# Patient Record
Sex: FEMALE | Race: WHITE | NOT HISPANIC OR LATINO | Employment: PART TIME | RURAL
[De-identification: names, ages, dates, MRNs, and addresses within clinical notes are randomized per-mention and may not be internally consistent; named-entity substitution may affect disease eponyms.]

---

## 2019-06-13 ENCOUNTER — CONVERSION ENCOUNTER (OUTPATIENT)
Dept: FAMILY MEDICINE CLINIC | Facility: CLINIC | Age: 24
End: 2019-06-13

## 2019-06-13 LAB
BILIRUB UR QL STRIP: NEGATIVE
CONV PROTEIN IN URINE BY AUTOMATED TEST STRIP: NEGATIVE
CONV UROBILINOGEN IN URINE BY AUTOMATED TEST STRIP: NEGATIVE MG/DL
GLUCOSE UR QL: NEGATIVE MG/DL
KETONES UR QL STRIP: NEGATIVE
NITRITE UR QL STRIP: NEGATIVE
PH UR STRIP.AUTO: 6.5 [PH]
SP GR UR STRIP: 1.02 (ref 1–1.03)

## 2019-06-21 ENCOUNTER — OFFICE VISIT (OUTPATIENT)
Dept: FAMILY MEDICINE CLINIC | Facility: CLINIC | Age: 24
End: 2019-06-21

## 2019-06-21 VITALS
OXYGEN SATURATION: 98 % | TEMPERATURE: 97.7 F | BODY MASS INDEX: 46.65 KG/M2 | DIASTOLIC BLOOD PRESSURE: 86 MMHG | RESPIRATION RATE: 18 BRPM | HEIGHT: 65 IN | WEIGHT: 280 LBS | SYSTOLIC BLOOD PRESSURE: 126 MMHG | HEART RATE: 98 BPM

## 2019-06-21 DIAGNOSIS — J01.91 ACUTE RECURRENT SINUSITIS, UNSPECIFIED LOCATION: Primary | ICD-10-CM

## 2019-06-21 DIAGNOSIS — J30.1 SEASONAL ALLERGIC RHINITIS DUE TO POLLEN: ICD-10-CM

## 2019-06-21 PROBLEM — J45.909 ASTHMA: Status: ACTIVE | Noted: 2019-06-21

## 2019-06-21 PROCEDURE — 99213 OFFICE O/P EST LOW 20 MIN: CPT | Performed by: FAMILY MEDICINE

## 2019-06-21 PROCEDURE — 96372 THER/PROPH/DIAG INJ SC/IM: CPT | Performed by: FAMILY MEDICINE

## 2019-06-21 RX ORDER — METRONIDAZOLE 500 MG/1
TABLET ORAL
Refills: 0 | COMMUNITY
Start: 2019-06-13 | End: 2019-06-21

## 2019-06-21 RX ORDER — AZELASTINE HCL 205.5 UG/1
2 SPRAY NASAL 2 TIMES DAILY PRN
Qty: 30 ML | Refills: 2 | Status: SHIPPED | OUTPATIENT
Start: 2019-06-21 | End: 2019-09-04

## 2019-06-21 RX ORDER — CEFTRIAXONE 1 G/1
1 INJECTION, POWDER, FOR SOLUTION INTRAMUSCULAR; INTRAVENOUS ONCE
Status: COMPLETED | OUTPATIENT
Start: 2019-06-21 | End: 2019-06-21

## 2019-06-21 RX ORDER — AZITHROMYCIN 250 MG/1
TABLET, FILM COATED ORAL
Qty: 6 TABLET | Refills: 0 | Status: SHIPPED | OUTPATIENT
Start: 2019-06-21 | End: 2019-09-04

## 2019-06-21 RX ORDER — FLUTICASONE PROPIONATE 50 MCG
2 SPRAY, SUSPENSION (ML) NASAL DAILY
Qty: 1 BOTTLE | Refills: 2 | Status: SHIPPED | OUTPATIENT
Start: 2019-06-21 | End: 2019-09-04

## 2019-06-21 RX ADMIN — CEFTRIAXONE 1 G: 1 INJECTION, POWDER, FOR SOLUTION INTRAMUSCULAR; INTRAVENOUS at 09:38

## 2019-06-21 NOTE — PATIENT INSTRUCTIONS

## 2019-06-21 NOTE — PROGRESS NOTES
"Subjective   Chica Burnett is a 24 y.o. female.     URI    This is a new problem. The current episode started in the past 7 days. The problem has been unchanged. The maximum temperature recorded prior to her arrival was 100.4 - 100.9 F. Associated symptoms include abdominal pain, congestion, coughing, diarrhea, headaches, nausea, a plugged ear sensation, rhinorrhea, sinus pain, sneezing, a sore throat, swollen glands, vomiting and wheezing. Pertinent negatives include no chest pain, ear pain or rash. She has tried acetaminophen, antihistamine and decongestant for the symptoms. The treatment provided no relief.     She reports severe allergies.  No OTC medications help.  She has been on immunotherapy in the past.  Currently only taking Benadryl.     The following portions of the patient's history were reviewed and updated as appropriate: allergies, current medications, past family history, past medical history, past social history, past surgical history and problem list.    Review of Systems   Constitutional: Positive for fever. Negative for chills.   HENT: Positive for congestion, rhinorrhea, sinus pressure, sneezing and sore throat. Negative for ear discharge, ear pain and facial swelling.    Eyes: Negative for double vision, discharge and visual disturbance.   Respiratory: Positive for cough and wheezing. Negative for shortness of breath.    Cardiovascular: Negative for chest pain, palpitations and leg swelling.   Gastrointestinal: Positive for abdominal pain, diarrhea, nausea and vomiting.   Musculoskeletal: Negative for myalgias.   Skin: Negative for rash.   Neurological: Positive for headache. Negative for syncope.       Objective    Vitals:    06/21/19 0853   BP: 126/86   Pulse: 98   Resp: 18   Temp: 97.7 °F (36.5 °C)   TempSrc: Oral   SpO2: 98%   Weight: 127 kg (280 lb)   Height: 165.1 cm (65\")     Physical Exam   Constitutional: She appears well-developed and well-nourished. No distress.   HENT:   Head: " Normocephalic and atraumatic.   Right Ear: Ear canal normal. Tympanic membrane is not perforated, not erythematous and not bulging.   Left Ear: Ear canal normal. Tympanic membrane is not perforated, not erythematous and not bulging.   Nose: No rhinorrhea.   Mouth/Throat: Uvula is midline and mucous membranes are normal. Posterior oropharyngeal erythema present. No oropharyngeal exudate or posterior oropharyngeal edema.   Eyes: Conjunctivae, EOM and lids are normal. Pupils are equal, round, and reactive to light. Right eye exhibits no discharge. Left eye exhibits no discharge.   Neck: Normal range of motion. Neck supple. No edema present.   Cardiovascular: Normal rate, regular rhythm and normal heart sounds.   Pulmonary/Chest: Effort normal and breath sounds normal.   Musculoskeletal: Normal range of motion.   Lymphadenopathy:     She has no cervical adenopathy.   Neurological: She is alert. No cranial nerve deficit.   Skin: No rash noted.   Psychiatric: She has a normal mood and affect. Thought content normal.         Assessment/Plan   Chica was seen today for uri.    Diagnoses and all orders for this visit:    Acute recurrent sinusitis, unspecified location  -     azithromycin (ZITHROMAX Z-SHAHID) 250 MG tablet; Take 2 tablets the first day, then 1 tablet daily for 4 days.  -     cefTRIAXone (ROCEPHIN) injection 1 g    Seasonal allergic rhinitis due to pollen  -     fluticasone (FLONASE) 50 MCG/ACT nasal spray; 2 sprays into the nostril(s) as directed by provider Daily.  -     azelastine (ASTEPRO) 0.15 % solution nasal spray; 2 sprays into the nostril(s) as directed by provider 2 (Two) Times a Day As Needed for Rhinitis.

## 2019-06-22 PROBLEM — F32.A ANXIETY AND DEPRESSION: Status: ACTIVE | Noted: 2019-06-22

## 2019-06-22 PROBLEM — F41.9 ANXIETY AND DEPRESSION: Status: ACTIVE | Noted: 2019-06-22

## 2019-06-22 PROBLEM — E66.01 MORBID OBESITY (HCC): Status: ACTIVE | Noted: 2019-06-22

## 2019-06-22 PROBLEM — B01.9 CHICKEN POX: Status: ACTIVE | Noted: 2019-06-22

## 2019-06-22 PROBLEM — F60.9 PERSONALITY DISORDER (HCC): Status: ACTIVE | Noted: 2019-06-22

## 2019-06-22 PROBLEM — O99.340 MENTAL DISORDER DURING PREGNANCY: Status: ACTIVE | Noted: 2019-06-22

## 2019-06-22 PROBLEM — K58.9 IBS (IRRITABLE BOWEL SYNDROME): Status: ACTIVE | Noted: 2019-06-22

## 2019-06-22 PROBLEM — K21.9 GERD (GASTROESOPHAGEAL REFLUX DISEASE): Status: ACTIVE | Noted: 2019-06-22

## 2019-06-22 PROBLEM — E72.10 DISORDER OF SULFUR-BEARING AMINO ACID METABOLISM (HCC): Status: ACTIVE | Noted: 2019-06-22

## 2019-06-22 PROBLEM — IMO0001 BIRTH CONTROL: Status: ACTIVE | Noted: 2019-06-22

## 2019-06-22 PROBLEM — B19.20 HEPATITIS-C: Status: ACTIVE | Noted: 2019-06-22

## 2019-06-22 PROBLEM — IMO0002 LARGE FOR GESTATIONAL AGE FETUS: Status: ACTIVE | Noted: 2019-06-22

## 2019-06-22 PROBLEM — E16.2 HYPOGLYCEMIA: Status: ACTIVE | Noted: 2019-06-22

## 2019-06-22 PROBLEM — F17.200 TOBACCO USE DISORDER: Status: ACTIVE | Noted: 2019-06-22

## 2019-06-22 PROBLEM — K80.20 CALCULUS OF GALLBLADDER: Status: ACTIVE | Noted: 2018-09-25

## 2019-06-22 RX ORDER — BUSPIRONE HYDROCHLORIDE 10 MG/1
TABLET ORAL
COMMUNITY
Start: 2019-02-21 | End: 2019-09-04

## 2019-06-22 RX ORDER — ZIPRASIDONE HYDROCHLORIDE 60 MG/1
1 CAPSULE ORAL DAILY
COMMUNITY
Start: 2019-01-23 | End: 2019-09-04

## 2019-06-24 ENCOUNTER — OFFICE VISIT (OUTPATIENT)
Dept: FAMILY MEDICINE CLINIC | Facility: CLINIC | Age: 24
End: 2019-06-24

## 2019-06-24 VITALS
HEART RATE: 80 BPM | BODY MASS INDEX: 46.95 KG/M2 | SYSTOLIC BLOOD PRESSURE: 147 MMHG | RESPIRATION RATE: 18 BRPM | DIASTOLIC BLOOD PRESSURE: 70 MMHG | WEIGHT: 281.8 LBS | TEMPERATURE: 98.1 F | OXYGEN SATURATION: 98 % | HEIGHT: 65 IN

## 2019-06-24 DIAGNOSIS — Z48.02 VISIT FOR SUTURE REMOVAL: Primary | ICD-10-CM

## 2019-06-24 PROCEDURE — 99024 POSTOP FOLLOW-UP VISIT: CPT | Performed by: FAMILY MEDICINE

## 2019-07-19 NOTE — PROGRESS NOTES
Office Visit     Chica Burnett  6/13/2019 2:10 PM  Location:  [facility]  Patient #:  [id number]    /  Language:  English  /  Race:  White  Female          History of Present Illness  The patient is a 24 year old female who presents with a complaint of Lesion. The lesion appeared gradually and has been occurring for years. The course has been increasing in size. The lesion is characterized as pustular and raised above the skin. The   lesion is located over the upper extremity (Left Arm). The symptoms have been associated with recent enlargement, significant change in physical appearance (reddening or pigmentation change), pain and itching, while the symptoms have not been associated   with obstructs orifice, clinically suspicious for malignancy, previous biopsy suggests malignancy, recurrent physical trauma due to location, risk of rupture, inflammation, infection, bleeding, intense itching, alopecia, fatigue, fever, loss of sensation,   lymphadenopathy, mucous membrane lesions, nail changes or weight loss.    Additional Reason for Visit:    Back pain   is described as the following:  The onset of the back pain has been gradual and has been occurring in a persistent pattern for 1 month (She states her issues started when she got a job and started working). The course has been increasing and occurs more in early morning. The back pain is   characterized as a dull ache, stabbing, piercing, shooting and burning. The back pain is described as being located in the lower back and lumbar area. The pain is precipitated by heavy weight lifting. The symptoms are aggravated by exertion, prolonged   standing and prolonged sitting. The symptoms have no relieving factors. The symptoms have been associated with back stiffness and leg weakness, while the symptoms have not been associated with abdominal pain, arthritis of peripheral joints, chills, bladder   dysfunction, dysmenorrhea, dysuria, fever, flank  pain, hip pain, history  of myelography, history of back surgery, history of disc prolapse, history of malignancy, incontinence of stool, incontinence of urine, menorrhagia, paresthesias in leg, trauma,   urethral discharge, use of anti-coagulants, use of corticosteroids or vaginal discharge.    UTI  is described as the following:  The symptoms have been occurring for 6 days and have been  increasing. The urine is described as yellow and thick. The symptoms have been associated with vulvar irritation, low back pain and vaginal discharge (She states that she has a ottoniel odor coming   from down there and she states she has alot of itching as if she has a yeast infecton minus the discharge), while the symptoms have not been associated with arthralgia, fever, pain with defecation, perineal pain, skin rash, vulvar lesion, abdominal pain,   chills, perineal pruritus, abdominal cramps, diarrhea, nausea, vaginal bleeding, headache, pain with intercourse, vomiting or muscle pain. There is a history of sexual contact with a person exposed to an STD (Not to her knowledge) and new sexual partner,   while there is no history of sexual contact with a person having an STD, use of tampons, possible vaginal foreign body, douching, use of contraceptive devices, sexual assault, trauma, vulvovaginal exposure to chemical irritants, recent catheterization or   current catheterization. There is a medical history of recurrent urinary tract infections. The patient denies the use of oral contraceptives, antibiotics or hormone replacement therapy.           Problem List/Past Medical:    IBS (irritable bowel syndrome) (K58.9)    Chicken pox (B01.9)    GERD (gastroesophageal reflux disease) (K21.9)    Personality disorder (F60.9)    Asthma (J45.909)    Anxiety and depression (F41.9, F32.9)      Allergies:    amoxicillin    penicillins      Family History:    COPD (chronic obstructive pulmonary disease) (Z82.5)  Maternal Aunt.  Coronary Artery  Disease and/or Hypertension (Z82.49)  Mother, Maternal Grandfather, Maternal Grandmother, Maternal Aunt, Maternal Great Aunt.  CVA (Z82.3)  Maternal Grandmother.  Diabetes Mellitus (Z83.3)  Mother, Maternal Grandfather, Maternal Aunt, Sister.  Hyperlipidemia (Z83.438)  Maternal Aunt, Maternal Grandfather.  Skin Condidtions (Z84.0)  Son. eczema  Skin cancer (Z80.8)  Maternal Grandfather.  great grandfather: Brain cancer    liver cancer  Father, Paternal Grandmother, Paternal Grandfather.    Social History:    Alcohol Use  Occasional alcohol use.  Caffeine Use  a pot of coffee, moster enerygy drinks( 10 a week), soda 1-2 A week  Drug Use  Uses marijuana. occasionally less than 1 time a month  Exercise History  Exercises occasionally.  Living Situation  Lives alone.  Occupation  unemployed  Seat Belt Use  Always uses seat belts.  Tobacco Use  Former smoker. quit in June of 2018    Medication History:    BusPIRone HCl  (10MG Tablet, 1 (one) Oral two times daily, Taken starting 02/21/2019) Active.  Geodon  (60MG Capsule, 1 (one) Capsule Oral daily, Taken starting 01/23/2019) Active.  Medications Reconciled             Review of Symptons  General    Not Present-  Chills     -  Fever     -    and  Night Sweats     -    Skin    Present-  New Lesions     +    Not Present-  Rash     -    HEENT    Not Present-  Headache     -    Neck    Not Present-  Neck Stiffness     -    Respiratory    Not Present-  Dyspnea     -    Cardiovascular    Not Present-  Chest Pain     -    and  Palpitations     -    Gastrointestinal    Not Present-  Abdominal Pain     -  Constipation     -  Diarrhea     -    and  Vomiting     -    Female Genitourinary    Present-  Vaginal itching/burning     +    Musculoskeletal    Present-  Back Pain     +    Neurological    Not Present-  Dizziness     -    and  Visual Changes     -    Endocrine    Not Present-  Polydipsia     -    and  Polyuria     -           Physical Exam    General  Mental Status - Alert      Orientation - Oriented X4       Head and Neck  Neck - Global Assessment -     supple     Thyroid - Gland Characteristics -     no thyromegaly       ENMT  Ears - External Auditory Canal -  Left -     clear     Right -     clear          Otoscopic Exam - Middle Ear -  Left -     no effusion noted     Right -     no effusion noted     Tympanic Membrane -  Left -     No erythema noted     Right -     No   erythema noted     Nose and Sinuses - Inspection of the nares -  Bilateral -     without discharge     Frontal Sinuses -  Bilateral -     no tenderness observed     Maxillary Sinuses -  Bilateral -     no tenderness observed     Mouth and Throat -        Oral Cavity/Oropharynx - Oral Mucosa -     mucous membranes moist     Oropharynx -     No erythema       Chest and Lung Exam  Inspection - Movements -     Symmetrical     Accessory muscles -     No use of accessory muscles in breathing     Auscultation -      Breath sounds - Air flow -     Clear to auscultation          Adventitious sounds -   -     no rhonchi     no rales     no   wheezes       Cardiovascular  Inspection - Jugular vein -  Bilateral -     No Distention     Auscultation - Rhythm -     Regular     Heart Sounds -     S1 WNL     S2 WNL     No S3     No S4          Murmurs & Other Heart Sounds - Auscultation of the heart reveals -     No Murmurs       Abdomen  Palpation/Percussion - Palpation and Percussion of the abdomen reveal -     soft     Non Tender     No Rebound tenderness     No Rigidity (guarding)     No Jar tenderness     Liver -     Normal     Spleen -     Normal     Auscultation - Auscultation of the   abdomen reveals -     Bowel sounds normal       Female Genitourinary  External Genitalia -      Vulva - Hair Distribution -     Normal     Lesions -     None     Perineum -     Normal          Urethra - Characteristics -     Non Tender     Discharge -     None     Speculum & Bimanual -      Vagina - Vaginal Lesions -       None          Vaginal  Wall - Enterocele -     No enterocele     Rectocele -     No rectocele          Cervix - Characteristics -     No Motion tenderness     No Lesions     Discharge -     Copious     Malodorous          Uterus - Characteristics -     Non   Tender     Not Prolapsed     Not Smooth     no Slightly enlarged     Position -     Midposition     Fundal Height -     Just above symphysis pubis     Vaginal Mucosa -     Normal          Adnexa - Characteristics -  Left -     Non Palpable     Non Tender       Right -     Non Palpable     Non Tender     Masses -     No Adnexal Masses     Bladder -     Normal       Peripheral Vascular  Lower Extremity -      Palpation - Tenderness -  Bilateral -     Non Tender     Dorsalis pedis pulse -  Bilateral -     2+     Edema -  Bilateral -     No edema       Neurologic  Coordination - Normal     Gait - Normal                     Assessment and Plan:  UTI (urinary tract infection) (N39.0)    Prescriptions as below.  Common drug reactions discussed - watch for itching, rash, diarrhea and upset stomach.  Stop pyridium after 2 days to see if symptoms have improved.  Tylenol and/or ibuprofen for pain/fever.  Increase fluids.  Culture sent - will   follow-up with patient by phone once received.  Call or return to the office if no improvement in 3-4 days or if symptoms progress despite medication.        U/A Dip (23663)  Urine C & S (04147)    Low back pain (M54.5)    He was advised to use NSAIDs to treat his symptoms.      Benign skin lesion (L98.9)    The skin lesion was removed.        Tissue, Pathology Report  Biopsy, Punch,including simple closure, when performed (95396)    Vaginal itching (N89.8)    She has BV.  She was treat with Flagyl.        Started MetroNIDAZOLE 500MG, 1 (one) Tablet two times daily, #14, 7 days starting 06/13/2019, No Refill.  Local Order: Do not use alcohol for at least 24 hours after the last dose.  History Of Tobacco Use  (Z87.891)            Pt Education - Smoking:  Reasons Not To Smoke: discussed with patient and provided information.  Education about Importance of not smoking ()    Morbid obesity >40 (E66.01) <HCC22>            DOCUMENTATION OF FOLLOW-UP PLAN FOR PATIENT WITH BMI ABOVE NORMAL ()  Pt Education - Weight Loss Diets *: losing weight: discussed with patient and provided information.  Pt Education - How to access health information online: discussed with patient and provided information.                            Signed By:      6/30/2019 12:28 AM     Chuck Burnett MD  Signed electronically by Chuck Burnett MD (6/30/2019 12:28 AM)  __________     Disclaimer: Converted Note may not contain all data elements that existed in the [employer] source system. Please see [employer] for the original note message details.

## 2019-07-22 VITALS
OXYGEN SATURATION: 94 % | HEART RATE: 84 BPM | WEIGHT: 282.6 LBS | RESPIRATION RATE: 18 BRPM | HEIGHT: 62 IN | DIASTOLIC BLOOD PRESSURE: 83 MMHG | BODY MASS INDEX: 52.01 KG/M2 | SYSTOLIC BLOOD PRESSURE: 123 MMHG

## 2019-09-04 ENCOUNTER — OFFICE VISIT (OUTPATIENT)
Dept: FAMILY MEDICINE CLINIC | Facility: CLINIC | Age: 24
End: 2019-09-04

## 2019-09-04 VITALS
RESPIRATION RATE: 18 BRPM | BODY MASS INDEX: 47.85 KG/M2 | TEMPERATURE: 97.8 F | HEART RATE: 61 BPM | HEIGHT: 65 IN | SYSTOLIC BLOOD PRESSURE: 126 MMHG | OXYGEN SATURATION: 98 % | DIASTOLIC BLOOD PRESSURE: 74 MMHG | WEIGHT: 287.2 LBS

## 2019-09-04 DIAGNOSIS — R31.9 URINARY TRACT INFECTION WITH HEMATURIA, SITE UNSPECIFIED: Primary | ICD-10-CM

## 2019-09-04 DIAGNOSIS — N39.0 URINARY TRACT INFECTION WITH HEMATURIA, SITE UNSPECIFIED: Primary | ICD-10-CM

## 2019-09-04 DIAGNOSIS — A59.9 TRICHIMONIASIS: ICD-10-CM

## 2019-09-04 LAB
BILIRUB BLD-MCNC: NEGATIVE MG/DL
CLARITY, POC: ABNORMAL
COLOR UR: YELLOW
GLUCOSE UR STRIP-MCNC: NEGATIVE MG/DL
KETONES UR QL: ABNORMAL
LEUKOCYTE EST, POC: ABNORMAL
NITRITE UR-MCNC: NEGATIVE MG/ML
PH UR: 5 [PH] (ref 5–8)
PROT UR STRIP-MCNC: ABNORMAL MG/DL
RBC # UR STRIP: ABNORMAL /UL
SP GR UR: 1.02 (ref 1–1.03)
UROBILINOGEN UR QL: NORMAL

## 2019-09-04 PROCEDURE — 81002 URINALYSIS NONAUTO W/O SCOPE: CPT | Performed by: FAMILY MEDICINE

## 2019-09-04 PROCEDURE — 99213 OFFICE O/P EST LOW 20 MIN: CPT | Performed by: FAMILY MEDICINE

## 2019-09-04 RX ORDER — METRONIDAZOLE 500 MG/1
500 TABLET ORAL 2 TIMES DAILY
Qty: 14 TABLET | Refills: 1 | Status: SHIPPED | OUTPATIENT
Start: 2019-09-04 | End: 2019-09-11

## 2019-09-04 NOTE — PROGRESS NOTES
Chief Complaint   Patient presents with   • Vaginal Discharge   • Urinary Tract Infection       History of Present Illness:  Subjective   Chica Burnett is a 24 y.o. female.   Vaginal Discharge   The patient's primary symptoms include genital itching, a genital odor, a genital rash, pelvic pain and vaginal discharge. The patient's pertinent negatives include no vaginal bleeding. This is a recurrent problem. The current episode started more than 1 month ago. The problem occurs daily. The problem has been gradually worsening. The pain is mild. The problem affects both sides. She is not pregnant. Associated symptoms include abdominal pain, back pain, discolored urine, frequency, headaches, nausea, painful intercourse, rash and urgency. The vaginal discharge was green, milky, copious, mucoid, brown, thick and yellow. There has been no bleeding. She has not been passing clots. She has not been passing tissue. The symptoms are aggravated by intercourse, urinating and tactile pressure. She has tried warm baths, douching and antibiotics for the symptoms. The treatment provided no relief. She is sexually active. It is unknown whether or not her partner has an STD. She uses nothing for contraception. Her menstrual history has been regular. Her past medical history is significant for vaginosis.   Urinary Frequency    This is a recurrent problem. The current episode started more than 1 month ago. The problem has been gradually worsening. The quality of the pain is described as burning. The pain is at a severity of 7/10. The pain is moderate. There has been no fever. She is sexually active. There is no history of pyelonephritis. Associated symptoms include frequency, nausea and urgency. She has tried nothing for the symptoms. Her past medical history is significant for kidney stones and recurrent UTIs.        Allergies:  Allergies   Allergen Reactions   • Penicillin G Unknown (See Comments)       Social History:  Social  History     Socioeconomic History   • Marital status:      Spouse name: Not on file   • Number of children: Not on file   • Years of education: Not on file   • Highest education level: Not on file   Tobacco Use   • Smoking status: Former Smoker     Last attempt to quit: 2018     Years since quittin.3   • Smokeless tobacco: Never Used   Substance and Sexual Activity   • Alcohol use: Yes     Comment: Occasional   • Drug use: No     Types: Marijuana     Comment: occasionally less than 1 time a month   • Sexual activity: Defer   Social History Narrative    ** Merged History Encounter **            Family History:  Family History   Problem Relation Age of Onset   • Coronary artery disease Mother    • Diabetes Mother         Diabetes Mellitus   • Liver cancer Father    • Diabetes Sister         Diabetes Mellitus   • Eczema Son    • COPD Maternal Aunt    • Coronary artery disease Maternal Aunt    • Diabetes Maternal Aunt         Diabetes Mellitus   • Hyperlipidemia Maternal Aunt         Hyperlipidemia   • Coronary artery disease Maternal Grandmother    • Stroke Maternal Grandmother    • Coronary artery disease Maternal Grandfather    • Diabetes Maternal Grandfather         Diabetes Mellitus   • Hyperlipidemia Maternal Grandfather         Hyperlipidemia   • Skin cancer Maternal Grandfather    • Liver cancer Paternal Grandmother    • Liver cancer Paternal Grandfather    • Brain cancer Other         great grandfather   • Hypertension Mother    • Cancer Father         liver   • COPD Maternal Aunt    • Hypertension Maternal Aunt    • Coronary artery disease Maternal Aunt    • Diabetes Maternal Aunt    • Hyperlipidemia Maternal Aunt    • Hypertension Maternal Grandmother    • Hypertension Maternal Grandfather    • Cancer Maternal Grandfather         skin   • Cancer Paternal Grandmother         liver   • Cancer Paternal Grandfather         liver   • Hypertension Other         Maternal Great Aunt        Past Medical  History :  Active Ambulatory Problems     Diagnosis Date Noted   • Asthma 06/21/2019   • Anxiety and depression 06/22/2019   • Birth control 06/22/2019   • Calculus of gallbladder 09/25/2018   • Chicken pox 06/22/2019   • 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   • GERD (gastroesophageal reflux disease) 06/22/2019   • Hepatitis-C 06/22/2019   • Tobacco use disorder 06/22/2019   • Hypoglycemia 06/22/2019   • IBS (irritable bowel syndrome) 06/22/2019   • Morbid obesity (CMS/HCC) 06/22/2019   • Personality disorder (CMS/[facility]) 06/22/2019   • Mental disorder during pregnancy 06/22/2019   • Large for gestational age fetus 06/22/2019     Resolved Ambulatory Problems     Diagnosis Date Noted   • No Resolved Ambulatory Problems     Past Medical History:   Diagnosis Date   • Abdominal pain    • Abdominal pain    • Anxiety and depression    • Anxiety and depression    • Asthma    • Benign skin lesion    • Birth control    • BV (bacterial vaginosis)    • Cellulitis    • Chicken pox    • Ear pain, right    • Flu-like symptoms    • Gallstones    • GERD (gastroesophageal reflux disease)    • Hepatitis-C    • Heterozygous MTHFR mutation C677T (CMS/[facility])    • History of inadequate prenatal care    • History of tobacco use    • Hypoglycemia    • IBS (irritable bowel syndrome)    • Lice    • Low back pain    • Mental disorder in pregnancy    • Morbid obesity with BMI of 40.0-44.9, adult (CMS/[facility])    • Otitis media, acute    • Personality disorder (CMS/[facility])    • RUQ abdominal pain    • Sore throat    • Tobacco use disorder    • UTI (urinary tract infection)    • Vaginal itching    • Weight loss        Medication List:  No current outpatient medications on file.    Past Surgical History:  Past Surgical History:   Procedure Laterality Date   • CHOLECYSTECTOMY     • TONSILLECTOMY AND ADENOIDECTOMY      Septmeber 16, 2010 at [facility]   • TONSILLECTOMY AND ADENOIDECTOMY  09/16/2010        The following  "portions of the patient's history were reviewed and updated as appropriate: allergies, current medications, past family history, past medical history, past social history, past surgical history and problem list.    Review Of Systems:  Review of Systems   Gastrointestinal: Positive for abdominal pain and nausea.   Genitourinary: Positive for frequency, pelvic pain, urgency and vaginal discharge.   Musculoskeletal: Positive for back pain.   Skin: Positive for rash.       Physical Exam:  Vital Signs:  Vitals:    09/04/19 1112   BP: 126/74   BP Location: Right arm   Patient Position: Sitting   Cuff Size: Large Adult   Pulse: 61   Resp: 18   Temp: 97.8 °F (36.6 °C)   TempSrc: Oral   SpO2: 98%   Weight: 130 kg (287 lb 3.2 oz)   Height: 165.1 cm (65\")     Body mass index is 47.79 kg/m².    Physical Exam   Constitutional: She is oriented to person, place, and time. She appears well-developed and well-nourished. She is active.   HENT:   Head: Normocephalic and atraumatic.   Nose: Nose normal.   Eyes: Conjunctivae and lids are normal. Pupils are equal, round, and reactive to light.   Neck: Normal range of motion. Neck supple.   Cardiovascular: Normal rate, regular rhythm, normal heart sounds and normal pulses.   Pulmonary/Chest: Effort normal and breath sounds normal. No respiratory distress.   Abdominal: Soft. Bowel sounds are normal.   Genitourinary: Pelvic exam was performed with patient supine. Cervix exhibits discharge and friability. Right adnexum displays tenderness. There is erythema and tenderness in the vagina. Vaginal discharge found.   Musculoskeletal: Normal range of motion.   Neurological: She is alert and oriented to person, place, and time.   Skin: Skin is warm and dry. Capillary refill takes less than 2 seconds.   Psychiatric: She has a normal mood and affect. Her behavior is normal. Judgment and thought content normal.   Vitals reviewed.        Assessment and Plan:  Diagnoses and all orders for this " visit:    1. Urinary tract infection with hematuria, site unspecified (Primary)  -     POCT urinalysis dipstick, manual  -     Urine Culture - Urine, Urine, Random Void    2. Trichimoniasis  -     metroNIDAZOLE (FLAGYL) 500 MG tablet; Take 1 tablet by mouth 2 (Two) Times a Day for 7 days. Do not use alcohol for at least 24 hours after the last dose.  Dispense: 14 tablet; Refill: 1

## 2019-09-09 LAB
BACTERIA UR CULT: NORMAL
BACTERIA UR CULT: NORMAL

## 2019-10-14 ENCOUNTER — TELEPHONE (OUTPATIENT)
Dept: FAMILY MEDICINE CLINIC | Facility: CLINIC | Age: 24
End: 2019-10-14

## 2019-10-14 NOTE — TELEPHONE ENCOUNTER
Attempted to call patient to notify her that she has to be seen- we can't send anything in for her.   Her voicemail hasn't been set up. Unable to leave message

## 2019-10-14 NOTE — TELEPHONE ENCOUNTER
Patient said needs antibiotic for vaginosis send to  [facility] in [place]--call  To advise

## 2019-11-20 ENCOUNTER — CLINICAL SUPPORT (OUTPATIENT)
Dept: FAMILY MEDICINE CLINIC | Facility: CLINIC | Age: 24
End: 2019-11-20

## 2019-11-20 DIAGNOSIS — Z23 NEED FOR HEPATITIS A VACCINATION: ICD-10-CM

## 2019-11-20 DIAGNOSIS — Z23 FLU VACCINE NEED: Primary | ICD-10-CM

## 2019-11-20 PROCEDURE — 90632 HEPA VACCINE ADULT IM: CPT | Performed by: FAMILY MEDICINE

## 2019-11-20 PROCEDURE — 90472 IMMUNIZATION ADMIN EACH ADD: CPT | Performed by: FAMILY MEDICINE

## 2019-11-20 PROCEDURE — 90471 IMMUNIZATION ADMIN: CPT | Performed by: FAMILY MEDICINE

## 2019-11-20 PROCEDURE — 90674 CCIIV4 VAC NO PRSV 0.5 ML IM: CPT | Performed by: FAMILY MEDICINE

## 2020-03-16 ENCOUNTER — CLINICAL SUPPORT (OUTPATIENT)
Dept: FAMILY MEDICINE CLINIC | Facility: CLINIC | Age: 25
End: 2020-03-16

## 2020-03-16 DIAGNOSIS — Z11.1 VISIT FOR TB SKIN TEST: Primary | ICD-10-CM

## 2020-03-16 PROCEDURE — 86580 TB INTRADERMAL TEST: CPT | Performed by: FAMILY MEDICINE

## 2020-03-19 ENCOUNTER — OFFICE VISIT (OUTPATIENT)
Dept: FAMILY MEDICINE CLINIC | Facility: CLINIC | Age: 25
End: 2020-03-19

## 2020-03-19 VITALS
HEART RATE: 78 BPM | WEIGHT: 286.8 LBS | TEMPERATURE: 98.6 F | HEIGHT: 65 IN | SYSTOLIC BLOOD PRESSURE: 112 MMHG | DIASTOLIC BLOOD PRESSURE: 61 MMHG | BODY MASS INDEX: 47.78 KG/M2 | OXYGEN SATURATION: 97 %

## 2020-03-19 DIAGNOSIS — H10.31 ACUTE BACTERIAL CONJUNCTIVITIS OF RIGHT EYE: ICD-10-CM

## 2020-03-19 DIAGNOSIS — H60.331 ACUTE SWIMMER'S EAR OF RIGHT SIDE: Primary | ICD-10-CM

## 2020-03-19 LAB
INDURATION: 0 MM (ref 0–10)
Lab: NORMAL
Lab: NORMAL
TB SKIN TEST: NEGATIVE

## 2020-03-19 PROCEDURE — 96372 THER/PROPH/DIAG INJ SC/IM: CPT | Performed by: FAMILY MEDICINE

## 2020-03-19 PROCEDURE — 99213 OFFICE O/P EST LOW 20 MIN: CPT | Performed by: FAMILY MEDICINE

## 2020-03-19 RX ORDER — DEXAMETHASONE SODIUM PHOSPHATE 1 MG/ML
1 SOLUTION/ DROPS OPHTHALMIC 4 TIMES DAILY
Qty: 5 ML | Refills: 1 | Status: SHIPPED | OUTPATIENT
Start: 2020-03-19 | End: 2020-03-26

## 2020-03-19 RX ORDER — DEXAMETHASONE SODIUM PHOSPHATE 10 MG/ML
10 INJECTION INTRAMUSCULAR; INTRAVENOUS ONCE
Status: COMPLETED | OUTPATIENT
Start: 2020-03-19 | End: 2020-03-19

## 2020-03-19 RX ORDER — TRAMADOL HYDROCHLORIDE 50 MG/1
50 TABLET ORAL EVERY 6 HOURS PRN
COMMUNITY
End: 2020-04-10

## 2020-03-19 RX ORDER — OFLOXACIN 3 MG/ML
1 SOLUTION/ DROPS OPHTHALMIC 4 TIMES DAILY
Qty: 1 EACH | Refills: 1 | Status: SHIPPED | OUTPATIENT
Start: 2020-03-19 | End: 2020-03-26

## 2020-03-19 RX ORDER — METHYLPREDNISOLONE ACETATE 80 MG/ML
80 INJECTION, SUSPENSION INTRA-ARTICULAR; INTRALESIONAL; INTRAMUSCULAR; SOFT TISSUE ONCE
Status: COMPLETED | OUTPATIENT
Start: 2020-03-19 | End: 2020-03-19

## 2020-03-19 RX ORDER — ACETAMINOPHEN 325 MG/1
650 TABLET ORAL EVERY 6 HOURS PRN
COMMUNITY
End: 2020-04-10

## 2020-03-19 RX ORDER — CIPROFLOXACIN 500 MG/1
500 TABLET, FILM COATED ORAL 2 TIMES DAILY
Qty: 20 TABLET | Refills: 0 | Status: SHIPPED | OUTPATIENT
Start: 2020-03-19 | End: 2020-04-10

## 2020-03-19 RX ADMIN — METHYLPREDNISOLONE ACETATE 80 MG: 80 INJECTION, SUSPENSION INTRA-ARTICULAR; INTRALESIONAL; INTRAMUSCULAR; SOFT TISSUE at 16:42

## 2020-03-19 RX ADMIN — DEXAMETHASONE SODIUM PHOSPHATE 10 MG: 10 INJECTION INTRAMUSCULAR; INTRAVENOUS at 16:41

## 2020-03-19 NOTE — ASSESSMENT & PLAN NOTE
She was given a Rocephin 1 g IM injection and a Depo-Medrol Medrol/dexamethasone IM injection.  She was prescribed ofloxacin and dexamethasone ophthalmic drops to put in her ears.  She was encouraged return to the clinic if her symptoms do not improve.

## 2020-03-19 NOTE — PROGRESS NOTES
Subjective   Chica Burnett is a 25 y.o. female.     Chief Complaint   Patient presents with   • Conjunctivitis   • Earache       Conjunctivitis    The current episode started yesterday. The onset was sudden. The problem occurs continuously. The problem has been gradually worsening. The problem is mild. Nothing relieves the symptoms. Nothing aggravates the symptoms. Associated symptoms include a fever, eye itching, ear discharge, ear pain, hearing loss, eye discharge and eye redness. Pertinent negatives include no nausea, no vomiting, no muscle aches, no cough and no wheezing. The eye pain is mild. The right eye is affected. The eye pain is not associated with movement. The eyelid exhibits redness. The fever has been present for 1 to 2 days. The maximum temperature noted was less than 100.4 F. The temperature was taken using an oral thermometer. Cough associated with: patient has no cough. Congestion type: no congestion. Sore throat severity: no sore throat. The ear pain is moderate. There is pain in the right ear. There is no abnormality behind the ear.   Earache    There is pain in the right ear. This is a new (ear pain started 2 weeks ago with a right itchy ear that truned into swollen and inflamed right ear patient stats it hurts to chew and open her mouth. ) problem. The current episode started 1 to 4 weeks ago. The problem occurs constantly. The problem has been gradually worsening. Maximum temperature: less the 100.4. The fever has been present for less than 1 day. The pain is at a severity of 10/10. The pain is severe. Associated symptoms include ear discharge and hearing loss. Pertinent negatives include no coughing or vomiting. She has tried nothing for the symptoms. The treatment provided no relief.   Fever    This is a new (patient also had a fever yesterday has been taking tylenol to help lower fever only lasted for one day ) problem. The current episode started yesterday. The problem occurs 2 to 4  times per day. The problem has been resolved. The maximum temperature noted was 99 to 99.9 F. The temperature was taken using an oral thermometer. Associated symptoms include ear pain. Pertinent negatives include no coughing, muscle aches, nausea, vomiting or wheezing. She has tried acetaminophen for the symptoms. The treatment provided significant relief.        The following portions of the patient's history were reviewed and updated as appropriate: allergies, current medications, past family history, past medical history, past social history, past surgical history and problem list.    Allergies:  Allergies   Allergen Reactions   • Penicillin G Unknown (See Comments)       Social History:  Social History     Socioeconomic History   • Marital status:      Spouse name: Not on file   • Number of children: Not on file   • Years of education: Not on file   • Highest education level: Not on file   Tobacco Use   • Smoking status: Former Smoker     Last attempt to quit: 2018     Years since quittin.8   • Smokeless tobacco: Never Used   Substance and Sexual Activity   • Alcohol use: Yes     Comment: Occasional   • Drug use: No     Types: Marijuana     Comment: occasionally less than 1 time a month   • Sexual activity: Defer   Social History Narrative    ** Merged History Encounter **            Family History:  Family History   Problem Relation Age of Onset   • Coronary artery disease Mother    • Diabetes Mother         Diabetes Mellitus   • Liver cancer Father    • Diabetes Sister         Diabetes Mellitus   • Eczema Son    • COPD Maternal Aunt    • Coronary artery disease Maternal Aunt    • Diabetes Maternal Aunt         Diabetes Mellitus   • Hyperlipidemia Maternal Aunt         Hyperlipidemia   • Coronary artery disease Maternal Grandmother    • Stroke Maternal Grandmother    • Coronary artery disease Maternal Grandfather    • Diabetes Maternal Grandfather         Diabetes Mellitus   • Hyperlipidemia  Maternal Grandfather         Hyperlipidemia   • Skin cancer Maternal Grandfather    • Liver cancer Paternal Grandmother    • Liver cancer Paternal Grandfather    • Brain cancer Other         great grandfather   • Hypertension Mother    • Cancer Father         liver   • COPD Maternal Aunt    • Hypertension Maternal Aunt    • Coronary artery disease Maternal Aunt    • Diabetes Maternal Aunt    • Hyperlipidemia Maternal Aunt    • Hypertension Maternal Grandmother    • Hypertension Maternal Grandfather    • Cancer Maternal Grandfather         skin   • Cancer Paternal Grandmother         liver   • Cancer Paternal Grandfather         liver   • Hypertension Other         Maternal Great Aunt        Past Medical History :  Patient Active Problem List   Diagnosis   • Asthma   • Anxiety and depression   • Birth control   • Calculus of gallbladder   • Chicken pox   • 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     • traMADol (ULTRAM) 50 MG tablet Take 50 mg by mouth Every 6 (Six) Hours As Needed for Moderate Pain .       No facility-administered encounter medications on file as of 3/19/2020.        Past Surgical History:  Past Surgical History:   Procedure Laterality Date   • CHOLECYSTECTOMY     • TONSILLECTOMY AND ADENOIDECTOMY      Septmeber 16, 2010 at [facility]   • TONSILLECTOMY AND ADENOIDECTOMY  09/16/2010       Review of Systems:  Review of Systems   Constitutional: Positive for fever.   HENT: Positive for ear  "discharge, ear pain and hearing loss.    Eyes: Positive for discharge, redness and itching.   Respiratory: Negative for cough and wheezing.    Gastrointestinal: Negative for nausea and vomiting.       I have reviewed and confirmed the accuracy of the ROS as documented by the MA/LPN/RN Chica Turner MA    Vital Signs:  Visit Vitals  /61   Pulse 78   Temp 98.6 °F (37 °C) (Oral)   Ht 165.1 cm (65\")   Wt 130 kg (286 lb 12.8 oz)   SpO2 97%   BMI 47.73 kg/m²       Physical Exam   Constitutional: She is oriented to person, place, and time. She appears well-developed and well-nourished.   HENT:   Head: Normocephalic.   Right Ear: There is drainage, swelling and tenderness. Tympanic membrane is injected.   Left Ear: External ear normal.   Nose: Nose normal.   Eyes: Right eye exhibits discharge and exudate. Right conjunctiva is injected.   Neck: Normal range of motion. Neck supple.   Cardiovascular: Normal rate and regular rhythm.   Pulmonary/Chest: Effort normal and breath sounds normal.   Musculoskeletal: Normal range of motion.   Neurological: She is alert and oriented to person, place, and time.   Skin: Skin is warm and dry. Capillary refill takes less than 2 seconds.   Vitals reviewed.      Assessment and Plan:  Diagnoses and all orders for this visit:    1. Acute swimmer's ear of right side (Primary)  Assessment & Plan:  She was given a Rocephin 1 g IM injection and a Depo-Medrol Medrol/dexamethasone IM injection.  She was prescribed ofloxacin and dexamethasone ophthalmic drops to put in her ears.  She was encouraged return to the clinic if her symptoms do not improve.    Orders:  -     ofloxacin (OCUFLOX) 0.3 % ophthalmic solution; Administer 1 drop to the right eye 4 (Four) Times a Day for 7 days. Can use 1-2 drops every 2-4 hours for 2 days, then 4 times daily for 5 more days.  Dispense: 1 each; Refill: 1  -     dexamethasone (DECADRON) 0.1 % ophthalmic solution; Administer 1 drop to the right eye 4 (Four) " Times a Day for 7 days.  Dispense: 5 mL; Refill: 1  -     ciprofloxacin (CIPRO) 500 MG tablet; Take 1 tablet by mouth 2 (Two) Times a Day.  Dispense: 20 tablet; Refill: 0  -     cefTRIAXone (ROCEPHIN) 350 mg/ml in lidocaine 1% IM syringe (1 gm vial)  -     dexamethasone (DECADRON) injection 10 mg  -     methylPREDNISolone acetate (DEPO-medrol) injection 80 mg    2. Acute bacterial conjunctivitis of right eye  Assessment & Plan:  She was prescribed dexamethasone and oxifloxacin        An After Visit Summary and PPPS were given to the patient.

## 2020-04-10 ENCOUNTER — OFFICE VISIT (OUTPATIENT)
Dept: FAMILY MEDICINE CLINIC | Facility: CLINIC | Age: 25
End: 2020-04-10

## 2020-04-10 VITALS
DIASTOLIC BLOOD PRESSURE: 82 MMHG | SYSTOLIC BLOOD PRESSURE: 127 MMHG | HEART RATE: 123 BPM | OXYGEN SATURATION: 98 % | TEMPERATURE: 98.3 F | WEIGHT: 279.4 LBS | HEIGHT: 65 IN | BODY MASS INDEX: 46.55 KG/M2

## 2020-04-10 DIAGNOSIS — J45.909 ASTHMA, UNSPECIFIED ASTHMA SEVERITY, UNSPECIFIED WHETHER COMPLICATED, UNSPECIFIED WHETHER PERSISTENT: Primary | ICD-10-CM

## 2020-04-10 DIAGNOSIS — B19.20 HEPATITIS C VIRUS INFECTION WITHOUT HEPATIC COMA, UNSPECIFIED CHRONICITY: ICD-10-CM

## 2020-04-10 DIAGNOSIS — Z02.1 PRE-EMPLOYMENT EXAMINATION: Primary | ICD-10-CM

## 2020-04-10 PROBLEM — K80.20 CALCULUS OF GALLBLADDER: Status: RESOLVED | Noted: 2018-09-25 | Resolved: 2020-04-10

## 2020-04-10 PROBLEM — H10.31 ACUTE BACTERIAL CONJUNCTIVITIS OF RIGHT EYE: Status: RESOLVED | Noted: 2020-03-19 | Resolved: 2020-04-10

## 2020-04-10 PROBLEM — H60.331 ACUTE SWIMMER'S EAR OF RIGHT SIDE: Status: RESOLVED | Noted: 2020-03-19 | Resolved: 2020-04-10

## 2020-04-10 PROCEDURE — PEPHY: Performed by: FAMILY MEDICINE

## 2020-04-10 RX ORDER — OXYCODONE HYDROCHLORIDE AND ACETAMINOPHEN 5; 325 MG/1; MG/1
1 TABLET ORAL DAILY
COMMUNITY
Start: 2020-04-01 | End: 2021-03-29

## 2020-04-10 RX ORDER — IBUPROFEN 800 MG/1
1 TABLET ORAL EVERY 6 HOURS PRN
COMMUNITY
Start: 2020-04-08

## 2020-04-10 RX ORDER — ALBUTEROL SULFATE 90 UG/1
2 AEROSOL, METERED RESPIRATORY (INHALATION) EVERY 4 HOURS PRN
Qty: 1 INHALER | Refills: 2 | Status: SHIPPED | OUTPATIENT
Start: 2020-04-10

## 2020-04-10 RX ORDER — TIZANIDINE 4 MG/1
4 TABLET ORAL NIGHTLY PRN
COMMUNITY
Start: 2020-03-30 | End: 2021-03-29

## 2020-05-15 ENCOUNTER — OFFICE VISIT (OUTPATIENT)
Dept: FAMILY MEDICINE CLINIC | Facility: CLINIC | Age: 25
End: 2020-05-15

## 2020-05-15 VITALS
WEIGHT: 282.2 LBS | OXYGEN SATURATION: 98 % | TEMPERATURE: 98 F | DIASTOLIC BLOOD PRESSURE: 89 MMHG | HEIGHT: 65 IN | HEART RATE: 84 BPM | RESPIRATION RATE: 18 BRPM | BODY MASS INDEX: 47.02 KG/M2 | SYSTOLIC BLOOD PRESSURE: 124 MMHG

## 2020-05-15 DIAGNOSIS — G89.29 CHRONIC BILATERAL LOW BACK PAIN, UNSPECIFIED WHETHER SCIATICA PRESENT: Primary | ICD-10-CM

## 2020-05-15 DIAGNOSIS — Z30.011 ENCOUNTER FOR INITIAL PRESCRIPTION OF CONTRACEPTIVE PILLS: ICD-10-CM

## 2020-05-15 DIAGNOSIS — M54.50 CHRONIC BILATERAL LOW BACK PAIN, UNSPECIFIED WHETHER SCIATICA PRESENT: Primary | ICD-10-CM

## 2020-05-15 PROBLEM — Z30.9 ENCOUNTER FOR BIRTH CONTROL: Status: ACTIVE | Noted: 2019-06-22

## 2020-05-15 PROCEDURE — 99213 OFFICE O/P EST LOW 20 MIN: CPT | Performed by: FAMILY MEDICINE

## 2020-05-15 RX ORDER — NORETHINDRONE ACETATE AND ETHINYL ESTRADIOL 1; .02 MG/1; MG/1
1 TABLET ORAL DAILY
Qty: 28 TABLET | Refills: 12 | Status: SHIPPED | OUTPATIENT
Start: 2020-05-15

## 2020-05-15 NOTE — PROGRESS NOTES
Chief Complaint   Patient presents with   • Contraception     Depo-Provera   • Back Pain     would like to be referred elsewhere       History of Present Illness:  Subjective   Chica Burnett is a 25 y.o. female.   History of Present Illness     Birth Control:   Patient would like to discuss starting injectable birth control. She is currently not on any birthcontrol. She is sexually active- not using any current form of protection. The first day of her last period was 4/23/2020.    Back Pain:  She reports that she has degenerative disc disease & arthritis in her spine. She had been seeing [facility] and would like for us to take over her pain medication prescription and explore other options.   She said they offered her injections and she isn't comfortable having these done at such a young age. She reports she is taking Percocet 5/325mg 1 time daily and she has asked them to give this to her 2x daily but they won't increase it. She says she doesn't want to be on long term pain medication and would like to explore other options for her back pain. She currently sees a chiropractor once every other week. She goes to [facility].   Patient asked about non narcotic options- she said was previously on Tramadol 1 or 2 times a day and that didn't help either.   She said her muscle relaxer works well- but it makes her sleepy so she can't take it during the day. (Tizanadine)  She asked about a pain patch- possibly a Lidocaine patch for her back pain.   Patients reports her pain is in the middle of her back- no radiating pain to the legs, no loss of bowel or bladder.     Allergies:  Allergies   Allergen Reactions   • Penicillin G Unknown (See Comments)       Social History:  Social History     Socioeconomic History   • Marital status:      Spouse name: Not on file   • Number of children: Not on file   • Years of education: Not on file   • Highest education level: Not on file   Tobacco Use    • Smoking status: Former Smoker     Last attempt to quit: 2018     Years since quittin.9   • Smokeless tobacco: Never Used   Substance and Sexual Activity   • Alcohol use: Yes     Comment: Occasional   • Drug use: No     Types: Marijuana     Comment: occasionally less than 1 time a month   • Sexual activity: Defer   Social History Narrative    ** Merged History Encounter **            Family History:  Family History   Problem Relation Age of Onset   • Coronary artery disease Mother    • Diabetes Mother         Diabetes Mellitus   • Liver cancer Father    • Diabetes Sister         Diabetes Mellitus   • Eczema Son    • COPD Maternal Aunt    • Coronary artery disease Maternal Aunt    • Diabetes Maternal Aunt         Diabetes Mellitus   • Hyperlipidemia Maternal Aunt         Hyperlipidemia   • Coronary artery disease Maternal Grandmother    • Stroke Maternal Grandmother    • Coronary artery disease Maternal Grandfather    • Diabetes Maternal Grandfather         Diabetes Mellitus   • Hyperlipidemia Maternal Grandfather         Hyperlipidemia   • Skin cancer Maternal Grandfather    • Liver cancer Paternal Grandmother    • Liver cancer Paternal Grandfather    • Brain cancer Other         great grandfather   • Hypertension Mother    • Cancer Father         liver   • COPD Maternal Aunt    • Hypertension Maternal Aunt    • Coronary artery disease Maternal Aunt    • Diabetes Maternal Aunt    • Hyperlipidemia Maternal Aunt    • Hypertension Maternal Grandmother    • Hypertension Maternal Grandfather    • Cancer Maternal Grandfather         skin   • Cancer Paternal Grandmother         liver   • Cancer Paternal Grandfather         liver   • Hypertension Other         Maternal Great Aunt        Past Medical History :  Active Ambulatory Problems     Diagnosis Date Noted   • Asthma 2019   • Anxiety and depression 2019   • Encounter for birth control 2019   • Chicken pox 2019   • 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) 06/22/2019   • GERD (gastroesophageal reflux disease) 06/22/2019   • Hepatitis-C 06/22/2019   • Tobacco use disorder 06/22/2019   • Hypoglycemia 06/22/2019   • IBS (irritable bowel syndrome) 06/22/2019   • Morbid obesity (CMS/HCC) 06/22/2019   • Personality disorder (CMS/HCC) 06/22/2019   • Mental disorder during pregnancy 06/22/2019   • Large for gestational age fetus 06/22/2019   • Chronic bilateral low back pain 05/15/2020     Resolved Ambulatory Problems     Diagnosis Date Noted   • Calculus of gallbladder 09/25/2018   • Acute bacterial conjunctivitis of right eye 03/19/2020   • Acute swimmer's ear of right side 03/19/2020     Past Medical History:   Diagnosis Date   • Abdominal pain    • Abdominal pain    • Benign skin lesion    • Birth control    • BV (bacterial vaginosis)    • Cellulitis    • Ear pain, right    • Flu-like symptoms    • Gallstones    • Heterozygous MTHFR mutation [id number] (CMS/HCC)    • History of inadequate prenatal care    • History of tobacco use    • Lice    • Low back pain    • Mental disorder in pregnancy    • Morbid obesity with BMI of 40.0-44.9, adult (CMS/HCC)    • Otitis media, acute    • RUQ abdominal pain    • Sore throat    • UTI (urinary tract infection)    • Vaginal itching    • Weight loss        Medication List:  Outpatient Encounter Medications as of 5/15/2020   Medication Sig Dispense Refill   • albuterol sulfate  (90 Base) MCG/ACT inhaler Inhale 2 puffs Every 4 (Four) Hours As Needed for Wheezing or Shortness of Air. 1 inhaler 2   • ibuprofen (ADVIL,MOTRIN) 800 MG tablet Take 1 tablet by mouth Every 6 (Six) Hours As Needed.     • oxyCODONE-acetaminophen (PERCOCET) 5-325 MG per tablet Take 1 tablet by mouth Daily.     • tiZANidine (ZANAFLEX) 4 MG tablet Take 4 mg by mouth At Night As Needed.     • norethindrone-ethinyl estradiol (Loestrin 1/20, 21,) 1-20 MG-MCG per tablet Take 1 tablet by mouth Daily. 28 tablet 12     No  "facility-administered encounter medications on file as of 5/15/2020.        Past Surgical History:  Past Surgical History:   Procedure Laterality Date   • CHOLECYSTECTOMY     • TONSILLECTOMY AND ADENOIDECTOMY      Septmeber 16, 2010 at [facility]   • TONSILLECTOMY AND ADENOIDECTOMY  09/16/2010        The following portions of the patient's history were reviewed and updated as appropriate: allergies, current medications, past family history, past medical history, past social history, past surgical history and problem list.    Review Of Systems:  Review of Systems   Constitutional: Negative for activity change and appetite change.   Eyes: Negative for blurred vision and double vision.   Respiratory: Negative for chest tightness and shortness of breath.    Cardiovascular: Negative for chest pain and leg swelling.   Gastrointestinal: Negative for abdominal pain and rectal pain.   Genitourinary: Negative.    Musculoskeletal: Positive for back pain and myalgias.   Skin: Negative for color change.   Neurological: Negative for dizziness, light-headedness and numbness.   Psychiatric/Behavioral: Negative for hallucinations.       Objective     Physical Exam:  Vital Signs:  Visit Vitals  /89   Pulse 84   Temp 98 °F (36.7 °C)   Resp 18   Ht 165.1 cm (65\")   Wt 128 kg (282 lb 3.2 oz)   SpO2 98%   BMI 46.96 kg/m²       Physical Exam   Constitutional: She is oriented to person, place, and time. She appears well-developed and well-nourished.   HENT:   Head: Normocephalic and atraumatic.   Eyes: Conjunctivae are normal.   Neck: Normal range of motion.   Cardiovascular: Normal rate, regular rhythm and normal heart sounds.   Pulmonary/Chest: Effort normal and breath sounds normal.   Abdominal: Soft. Bowel sounds are normal.   Musculoskeletal:        Lumbar back: She exhibits tenderness and pain.   Neurological: She is alert and oriented to person, place, and time.   Skin: Skin is warm and dry.   Vitals " reviewed.      Assessment/Plan   Assessment and Plan:  Diagnoses and all orders for this visit:    1. Chronic bilateral low back pain, unspecified whether sciatica present (Primary)  Assessment & Plan:  Patient was encouraged to return to [facility]. She was advised we would not prescribe pain medication for her. It was explained to her that we could refer her to another pain management but it may take time to get her in with one, we will not prescribe medications in the meantime. They will likely offer her the same type of treatment plan. It would be beneficial for her to follow their treatment plan, after her injections are done she needs to work on weight loss, and core strengthening- these are both contributing factors to her weight gain and thus her back pain, degeneration and arthritis.   She was asking about Lidocaine patches- her insurance wont pay for these. She was encouraged to try Salon-paas patches or Icy Hot patches.         2. Encounter for initial prescription of contraceptive pills  Assessment & Plan:  She previously had a Nexplanon implanted which has been removed.   She will start on oral birthcontrol for now- she may consider an IUD (Mirena) in the future.   I have spoken with her about risks/benefits of IUD insertion and side effects.  She was prescribed birth control pills.    Orders:  -     norethindrone-ethinyl estradiol (Loestrin 1/20, 21,) 1-20 MG-MCG per tablet; Take 1 tablet by mouth Daily.  Dispense: 28 tablet; Refill: 12

## 2020-05-15 NOTE — ASSESSMENT & PLAN NOTE
She previously had a Nexplanon implanted which has been removed.   She will start on oral birthcontrol for now- she may consider an IUD (Mirena) in the future.   I have spoken with her about risks/benefits of IUD insertion and side effects.  She was prescribed birth control pills.

## 2020-05-15 NOTE — ASSESSMENT & PLAN NOTE
Patient was encouraged to return to [facility]. She was advised we would not prescribe pain medication for her. It was explained to her that we could refer her to another pain management but it may take time to get her in with one, we will not prescribe medications in the meantime. They will likely offer her the same type of treatment plan. It would be beneficial for her to follow their treatment plan, after her injections are done she needs to work on weight loss, and core strengthening- these are both contributing factors to her weight gain and thus her back pain, degeneration and arthritis.   She was asking about Lidocaine patches- her insurance wont pay for these. She was encouraged to try Salon-paas patches or Icy Hot patches.

## 2020-10-14 ENCOUNTER — OFFICE (OUTPATIENT)
Dept: URBAN - METROPOLITAN AREA CLINIC 64 | Facility: CLINIC | Age: 25
End: 2020-10-14
Payer: COMMERCIAL

## 2020-10-14 VITALS — WEIGHT: 293 LBS | SYSTOLIC BLOOD PRESSURE: 143 MMHG | DIASTOLIC BLOOD PRESSURE: 93 MMHG | HEART RATE: 93 BPM

## 2020-10-14 DIAGNOSIS — R10.9 UNSPECIFIED ABDOMINAL PAIN: ICD-10-CM

## 2020-10-14 DIAGNOSIS — R76.8 OTHER SPECIFIED ABNORMAL IMMUNOLOGICAL FINDINGS IN SERUM: ICD-10-CM

## 2020-10-14 DIAGNOSIS — K59.00 CONSTIPATION, UNSPECIFIED: ICD-10-CM

## 2020-10-14 DIAGNOSIS — R11.0 NAUSEA: ICD-10-CM

## 2020-10-14 DIAGNOSIS — K21.9 GASTRO-ESOPHAGEAL REFLUX DISEASE WITHOUT ESOPHAGITIS: ICD-10-CM

## 2020-10-14 PROCEDURE — 99204 OFFICE O/P NEW MOD 45 MIN: CPT | Performed by: NURSE PRACTITIONER

## 2020-10-14 RX ORDER — PANTOPRAZOLE SODIUM 40 MG/1
40 TABLET, DELAYED RELEASE ORAL
Qty: 90 | Refills: 5 | Status: ACTIVE
Start: 2020-10-14

## 2020-10-14 RX ORDER — LINACLOTIDE 145 UG/1
CAPSULE, GELATIN COATED ORAL
Qty: 90 | Refills: 5 | Status: ACTIVE
Start: 2020-10-14

## 2021-02-16 ENCOUNTER — TELEHEALTH PROVIDED OTHER THAN IN PATIENT'S HOME (OUTPATIENT)
Dept: URBAN - METROPOLITAN AREA CLINIC 64 | Facility: CLINIC | Age: 26
End: 2021-02-16
Payer: COMMERCIAL

## 2021-02-16 DIAGNOSIS — R11.0 NAUSEA: ICD-10-CM

## 2021-02-16 DIAGNOSIS — K21.9 GASTRO-ESOPHAGEAL REFLUX DISEASE WITHOUT ESOPHAGITIS: ICD-10-CM

## 2021-02-16 DIAGNOSIS — R19.7 DIARRHEA, UNSPECIFIED: ICD-10-CM

## 2021-02-16 DIAGNOSIS — R76.8 OTHER SPECIFIED ABNORMAL IMMUNOLOGICAL FINDINGS IN SERUM: ICD-10-CM

## 2021-02-16 DIAGNOSIS — R94.5 ABNORMAL RESULTS OF LIVER FUNCTION STUDIES: ICD-10-CM

## 2021-02-16 DIAGNOSIS — R10.9 UNSPECIFIED ABDOMINAL PAIN: ICD-10-CM

## 2021-02-16 PROCEDURE — 99213 OFFICE O/P EST LOW 20 MIN: CPT | Mod: 95 | Performed by: NURSE PRACTITIONER

## 2021-02-16 RX ORDER — ONDANSETRON HYDROCHLORIDE 4 MG/1
16 TABLET, FILM COATED ORAL
Qty: 40 | Refills: 6 | Status: ACTIVE
Start: 2021-02-16

## 2021-03-12 ENCOUNTER — OFFICE VISIT (OUTPATIENT)
Dept: BARIATRICS/WEIGHT MGMT | Facility: CLINIC | Age: 26
End: 2021-03-12

## 2021-03-12 VITALS — BODY MASS INDEX: 49.76 KG/M2 | WEIGHT: 293 LBS

## 2021-03-12 DIAGNOSIS — E66.01 OBESITY, CLASS III, BMI 40-49.9 (MORBID OBESITY) (HCC): ICD-10-CM

## 2021-03-12 PROCEDURE — 97802 MEDICAL NUTRITION INDIV IN: CPT | Performed by: DIETITIAN, REGISTERED

## 2021-03-12 NOTE — PROGRESS NOTES
Chief Complaint   Patient presents with   • Obesity   • Nutrition Counseling       The patient is here for first month of supervised weight loss visit. Cinthia does grocery shopping and pt does the cooking. He does not want to eat healthier and does a lot of fat shaming. Pt listens to music to cope with stress. Pt mentions her whole family has weight issues. Pt has a good friend that she can talk with and states she is her support person. Pt overwhelmed with how much she has to change in her diet.    24 hour recall: wakes up at 6 am  Breakfast: steak, fried apples and salad (leftovers from last night) but usually skips  Lunch: 12 pm chicken tenders, salad or french fries  Supper: steak, salad and loaded BP  Snacks: chips or candy (pt admits to being an emotional eater)  Beverages: regular soda but recently reduced to one per day; water, Propel, low calorie monster energy drink, sweet tea, etoh-once every month (wine)     PA: walk 2 dogs 3 walks a day for 15 minutes each    Vitals:        BP: NA   Pulse: NA      Patient Active Problem List   Diagnosis   • Obesity          Body mass index is 49.76 kg/m².  Vitals       Documented weight        Weight: 299 pounds        Discussion/Plan:  Obesity/Morbid Obesity: I reviewed the appropriate dietary choices with the patient and encouraged the necessary changes. Discussed importance of self-care behaviors and setting small realistic and measurable goals.      Emailed pt written materials from our office for education.     I answered all of the patients questions regarding dietary changes, exercise or surgical options.    The patient will follow up in one month on April 20th.     PES: Food and nutrition related knowledge deficit RT uncertainty how to apply nutrition information AEB needed education for guidelines for bariatric surgery.       Pt agreed to participate phone visit.  Spent 20 minutes with patient.           GOALS:   1.) Use artifical sweeteners with tea and drink  less regular sweet tea  2.) Practice better portion control.  See handout.

## 2021-03-29 ENCOUNTER — OFFICE VISIT (OUTPATIENT)
Dept: FAMILY MEDICINE CLINIC | Facility: CLINIC | Age: 26
End: 2021-03-29

## 2021-03-29 VITALS
DIASTOLIC BLOOD PRESSURE: 80 MMHG | SYSTOLIC BLOOD PRESSURE: 126 MMHG | BODY MASS INDEX: 44.01 KG/M2 | RESPIRATION RATE: 18 BRPM | TEMPERATURE: 98.4 F | OXYGEN SATURATION: 97 % | HEART RATE: 71 BPM | WEIGHT: 280.4 LBS | HEIGHT: 67 IN

## 2021-03-29 DIAGNOSIS — E66.01 CLASS 3 SEVERE OBESITY DUE TO EXCESS CALORIES WITHOUT SERIOUS COMORBIDITY WITH BODY MASS INDEX (BMI) OF 40.0 TO 44.9 IN ADULT (HCC): ICD-10-CM

## 2021-03-29 DIAGNOSIS — F17.200 TOBACCO USE DISORDER: ICD-10-CM

## 2021-03-29 DIAGNOSIS — Z32.01 PREGNANCY TEST-POSITIVE: Primary | ICD-10-CM

## 2021-03-29 LAB
B-HCG UR QL: POSITIVE
BILIRUB BLD-MCNC: NEGATIVE MG/DL
CLARITY, POC: CLEAR
COLOR UR: YELLOW
GLUCOSE UR STRIP-MCNC: NEGATIVE MG/DL
INTERNAL NEGATIVE CONTROL: NEGATIVE
INTERNAL POSITIVE CONTROL: POSITIVE
KETONES UR QL: ABNORMAL
LEUKOCYTE EST, POC: NEGATIVE
Lab: ABNORMAL
NITRITE UR-MCNC: NEGATIVE MG/ML
PH UR: 5 [PH] (ref 5–8)
PROT UR STRIP-MCNC: NEGATIVE MG/DL
RBC # UR STRIP: NEGATIVE /UL
SP GR UR: 1.01 (ref 1–1.03)
UROBILINOGEN UR QL: NORMAL

## 2021-03-29 PROCEDURE — 81025 URINE PREGNANCY TEST: CPT | Performed by: FAMILY MEDICINE

## 2021-03-29 PROCEDURE — 99213 OFFICE O/P EST LOW 20 MIN: CPT | Performed by: FAMILY MEDICINE

## 2021-03-29 NOTE — PROGRESS NOTES
"Chief Complaint  Positive at home pregnancy test    Subjective     CC  Problem List  Visit Diagnosis   Encounters  Notes  Medications  Labs  Result Review Imaging  Media    Chica Burnett presents to [facility] for   Suspected Pregnancy:   The patient suspects she is pregnant due to missed menses, frequent urination, morning sickness and missed contraceptive dose. - 3. Parity- 3. Last menstrual period: 2021. The patient has complaints of frequent urination, morning sickness, nausea, abdominal cramps, low back pain and vomiting. Contraceptive history includes birth control pills. Chica has taken 9 at home pregnancy tests, all positive, one said error.      Review of Systems   Constitutional: Positive for fatigue. Negative for activity change, fever, unexpected weight gain and unexpected weight loss.   Respiratory: Negative for shortness of breath and wheezing.    Cardiovascular: Negative for chest pain and palpitations.   Gastrointestinal: Negative for abdominal pain.   Endocrine: Negative for cold intolerance, heat intolerance and polydipsia.   Genitourinary: Positive for amenorrhea. Negative for vaginal bleeding.        Objective   Vital Signs:   /80 (BP Location: Right arm, Patient Position: Sitting, Cuff Size: Adult)   Pulse 71   Temp 98.4 °F (36.9 °C) (Temporal)   Resp 18   Ht 168.9 cm (66.5\")   Wt 127 kg (280 lb 6.4 oz)   SpO2 97% Comment: room air  BMI 44.58 kg/m²     Physical Exam  Constitutional:       General: She is not in acute distress.  Cardiovascular:      Heart sounds: Normal heart sounds. No murmur heard.     Pulmonary:      Effort: Pulmonary effort is normal.      Breath sounds: Normal breath sounds.   Abdominal:      General: Abdomen is flat.      Palpations: Abdomen is soft.   Musculoskeletal:      Cervical back: Neck supple.   Lymphadenopathy:      Cervical: No cervical adenopathy.   Neurological:      Mental Status: She " is alert.        Result Review :Labs  Result Review  Imaging  Med Tab  Media                 Assessment and Plan CC Problem List  Visit Diagnosis  ROS  Review (Popup)  Health Maintenance  Quality  BestPractice  Medications  SmartSets  SnapShot Encounters  Media  Problem List Items Addressed This Visit        Unprioritized    Tobacco use disorder    Overview     2-3 cigarettes per day 1 pack per wk.            Current Assessment & Plan     Smoking cessation strongly encouraged given pregnancy         Class 3 severe obesity due to excess calories with body mass index (BMI) of 40.0 to 44.9 in adult (CMS/[facility])    Overview     Healthy diet and regular exercise discussed and encouraged.            Other Visit Diagnoses     Pregnancy test-positive    -  Primary    EGA 4 wks 5 days ANGEL 12/04/2021 Goyo has delivered her other children. Begin prenatal vitamins make OB apt as soon as possible.     Relevant Orders    POCT pregnancy, urine (Completed)    POCT urinalysis dipstick, manual (Completed)          Follow Up Instructions Charge Capture  Follow-up Communications  No follow-ups on file.  Patient was given instructions and counseling regarding her condition or for health maintenance advice. Please see specific information pulled into the AVS if appropriate.

## 2021-03-30 ENCOUNTER — TELEPHONE (OUTPATIENT)
Dept: FAMILY MEDICINE CLINIC | Facility: CLINIC | Age: 26
End: 2021-03-30

## 2021-03-30 NOTE — TELEPHONE ENCOUNTER
She stated that she missed a call from us. She seen Dr. Arteaga yesterday. Did you try to call her?

## 2021-03-30 NOTE — TELEPHONE ENCOUNTER
Caller: Chica Burnett    Relationship: Self    Best call back number: [phone] (H)    What orders are you requesting (i.e. lab or imaging): ORDERS FOR ULTRASOUND     In what timeframe would the patient need to come in: AS SOON AS POSSIBLE     Where will you receive your lab/imaging services: [facility]     Additional notes: PATIENT WOULD LIKE TO HAVE AN ULTRASOUND ORDERED IF POSSIBLE. PATIENT WAS SEEN IN THE OFFICE YESTERDAY BY DOCTOR BOLAÑOS, WHERE IT WAS CONFIRMED THAT SHE IS PREGNANT. SHE IS NOT ABLE TO GET IN TO SEE HER OBGYN UNTIL MAY 17TH. WOULD LIKE TO GET AN ULTRASOUND DONE TO SEE HOW FAR ALONG SHE IS. PLEASE CALL AND ADVISE.

## 2021-06-13 ENCOUNTER — HOSPITAL ENCOUNTER (EMERGENCY)
Facility: HOSPITAL | Age: 26
Discharge: HOME | End: 2021-06-13
Attending: EMERGENCY MEDICINE | Admitting: EMERGENCY MEDICINE

## 2021-06-13 VITALS
SYSTOLIC BLOOD PRESSURE: 120 MMHG | HEART RATE: 113 BPM | DIASTOLIC BLOOD PRESSURE: 75 MMHG | WEIGHT: 259.6 LBS | RESPIRATION RATE: 16 BRPM | BODY MASS INDEX: 41.72 KG/M2 | HEIGHT: 66 IN | OXYGEN SATURATION: 99 % | TEMPERATURE: 97.9 F

## 2021-06-13 DIAGNOSIS — K59.00 CONSTIPATION, UNSPECIFIED CONSTIPATION TYPE: Primary | ICD-10-CM

## 2021-06-13 DIAGNOSIS — Z3A.15 15 WEEKS GESTATION OF PREGNANCY: ICD-10-CM

## 2021-06-13 DIAGNOSIS — E86.0 DEHYDRATION: ICD-10-CM

## 2021-06-13 LAB
ALBUMIN SERPL-MCNC: 4 G/DL (ref 3.5–5.2)
ALBUMIN/GLOB SERPL: 1.3 G/DL
ALP SERPL-CCNC: 97 U/L (ref 39–117)
ALT SERPL W P-5'-P-CCNC: 42 U/L (ref 1–33)
AMORPH URATE CRY URNS QL MICRO: ABNORMAL /HPF
ANION GAP SERPL CALCULATED.3IONS-SCNC: 14 MMOL/L (ref 5–15)
AST SERPL-CCNC: 38 U/L (ref 1–32)
BACTERIA UR QL AUTO: ABNORMAL /HPF
BASOPHILS # BLD AUTO: 0.1 10*3/MM3 (ref 0–0.2)
BASOPHILS NFR BLD AUTO: 0.6 % (ref 0–1.5)
BILIRUB SERPL-MCNC: 0.4 MG/DL (ref 0–1.2)
BILIRUB UR QL STRIP: ABNORMAL
BUN SERPL-MCNC: 5 MG/DL (ref 6–20)
BUN/CREAT SERPL: 9.1 (ref 7–25)
CALCIUM SPEC-SCNC: 9.6 MG/DL (ref 8.6–10.5)
CHLORIDE SERPL-SCNC: 104 MMOL/L (ref 98–107)
CLARITY UR: ABNORMAL
CO2 SERPL-SCNC: 19 MMOL/L (ref 22–29)
COD CRY URNS QL: ABNORMAL /HPF
COLOR UR: ABNORMAL
CREAT SERPL-MCNC: 0.55 MG/DL (ref 0.57–1)
DEPRECATED RDW RBC AUTO: 40.7 FL (ref 37–54)
EOSINOPHIL # BLD AUTO: 0.1 10*3/MM3 (ref 0–0.4)
EOSINOPHIL NFR BLD AUTO: 1.3 % (ref 0.3–6.2)
ERYTHROCYTE [DISTWIDTH] IN BLOOD BY AUTOMATED COUNT: 13.3 % (ref 12.3–15.4)
GFR SERPL CREATININE-BSD FRML MDRD: 133 ML/MIN/1.73
GLOBULIN UR ELPH-MCNC: 3.1 GM/DL
GLUCOSE SERPL-MCNC: 99 MG/DL (ref 65–99)
GLUCOSE UR STRIP-MCNC: NEGATIVE MG/DL
HCT VFR BLD AUTO: 36.9 % (ref 34–46.6)
HGB BLD-MCNC: 12.7 G/DL (ref 12–15.9)
HGB UR QL STRIP.AUTO: NEGATIVE
HOLD SPECIMEN: NORMAL
HYALINE CASTS UR QL AUTO: ABNORMAL /LPF
KETONES UR QL STRIP: ABNORMAL
LEUKOCYTE ESTERASE UR QL STRIP.AUTO: ABNORMAL
LYMPHOCYTES # BLD AUTO: 1.9 10*3/MM3 (ref 0.7–3.1)
LYMPHOCYTES NFR BLD AUTO: 21 % (ref 19.6–45.3)
MCH RBC QN AUTO: 29.7 PG (ref 26.6–33)
MCHC RBC AUTO-ENTMCNC: 34.4 G/DL (ref 31.5–35.7)
MCV RBC AUTO: 86.4 FL (ref 79–97)
MONOCYTES # BLD AUTO: 0.4 10*3/MM3 (ref 0.1–0.9)
MONOCYTES NFR BLD AUTO: 4.4 % (ref 5–12)
MUCOUS THREADS URNS QL MICRO: ABNORMAL /HPF
NEUTROPHILS NFR BLD AUTO: 6.5 10*3/MM3 (ref 1.7–7)
NEUTROPHILS NFR BLD AUTO: 72.7 % (ref 42.7–76)
NITRITE UR QL STRIP: NEGATIVE
NRBC BLD AUTO-RTO: 0.3 /100 WBC (ref 0–0.2)
PH UR STRIP.AUTO: 5.5 [PH] (ref 5–8)
PLATELET # BLD AUTO: 274 10*3/MM3 (ref 140–450)
PMV BLD AUTO: 8.4 FL (ref 6–12)
POTASSIUM SERPL-SCNC: 3.9 MMOL/L (ref 3.5–5.2)
PROT SERPL-MCNC: 7.1 G/DL (ref 6–8.5)
PROT UR QL STRIP: ABNORMAL
RBC # BLD AUTO: 4.27 10*6/MM3 (ref 3.77–5.28)
RBC # UR: ABNORMAL /HPF
REF LAB TEST METHOD: ABNORMAL
SODIUM SERPL-SCNC: 137 MMOL/L (ref 136–145)
SP GR UR STRIP: 1.03 (ref 1–1.03)
SQUAMOUS #/AREA URNS HPF: ABNORMAL /HPF
STARCH GRANULES URNS QL MICRO: ABNORMAL /HPF
TRANS CELLS #/AREA URNS HPF: ABNORMAL /HPF
UROBILINOGEN UR QL STRIP: ABNORMAL
WBC # BLD AUTO: 8.9 10*3/MM3 (ref 3.4–10.8)
WBC UR QL AUTO: ABNORMAL /HPF

## 2021-06-13 PROCEDURE — 80053 COMPREHEN METABOLIC PANEL: CPT | Performed by: EMERGENCY MEDICINE

## 2021-06-13 PROCEDURE — 81001 URINALYSIS AUTO W/SCOPE: CPT | Performed by: EMERGENCY MEDICINE

## 2021-06-13 PROCEDURE — 96374 THER/PROPH/DIAG INJ IV PUSH: CPT

## 2021-06-13 PROCEDURE — 99284 EMERGENCY DEPT VISIT MOD MDM: CPT

## 2021-06-13 PROCEDURE — 25010000002 ONDANSETRON PER 1 MG: Performed by: EMERGENCY MEDICINE

## 2021-06-13 PROCEDURE — 85025 COMPLETE CBC W/AUTO DIFF WBC: CPT | Performed by: EMERGENCY MEDICINE

## 2021-06-13 PROCEDURE — 87086 URINE CULTURE/COLONY COUNT: CPT | Performed by: EMERGENCY MEDICINE

## 2021-06-13 RX ORDER — ONDANSETRON 2 MG/ML
4 INJECTION INTRAMUSCULAR; INTRAVENOUS ONCE
Status: COMPLETED | OUTPATIENT
Start: 2021-06-13 | End: 2021-06-13

## 2021-06-13 RX ORDER — ACETAMINOPHEN 500 MG
1000 TABLET ORAL ONCE
Status: COMPLETED | OUTPATIENT
Start: 2021-06-13 | End: 2021-06-13

## 2021-06-13 RX ADMIN — ONDANSETRON 4 MG: 2 INJECTION INTRAMUSCULAR; INTRAVENOUS at 19:57

## 2021-06-13 RX ADMIN — ACETAMINOPHEN 1000 MG: 500 TABLET, FILM COATED ORAL at 19:57

## 2021-06-13 RX ADMIN — SODIUM CHLORIDE, POTASSIUM CHLORIDE, SODIUM LACTATE AND CALCIUM CHLORIDE 1000 ML: 600; 310; 30; 20 INJECTION, SOLUTION INTRAVENOUS at 19:57

## 2021-06-13 RX ADMIN — SODIUM CHLORIDE, POTASSIUM CHLORIDE, SODIUM LACTATE AND CALCIUM CHLORIDE 1000 ML: 600; 310; 30; 20 INJECTION, SOLUTION INTRAVENOUS at 18:39

## 2021-06-13 NOTE — ED NOTES
Pt requests valentin and linda Anton notified, New orders received and noted     Demetrice Huang RN  06/13/21 1953

## 2021-06-13 NOTE — ED NOTES
Pt reports she is 15 weeks pregnant and sees Dr. Chisholm. Pt reports she is has been constipated for a couple months and has been vomiting and nauseous the entire pregnancy so far. Pt reports she had a bowel movement today and there was blood. Pt reports she may have a hemorrhoid. Pt reports abd cramping for a month and a half. Pt reports she is on methadone.     Genet Orellana RN  06/13/21 1823       Genet Orellana RN  06/13/21 1823

## 2021-06-13 NOTE — ED PROVIDER NOTES
Subjective   History of Present Illness  Constipation  26-year-old female complains of constipation over the last 1 month.  States this is a chronic condition for her.  She is now 15 weeks pregnant she reports no vaginal bleeding or discharge.  States she has not had a good bowel movement in over a month.  States she has used enemas and has small bowel movements from time to time.  She states she has had some nausea vomiting.  She was concerned about dehydration.  Review of Systems   Constitutional: Negative.    HENT: Negative.    Eyes: Negative.    Respiratory: Negative.    Cardiovascular: Negative.    Gastrointestinal: Positive for constipation, nausea and vomiting. Negative for abdominal distention.   Genitourinary: Negative.    Musculoskeletal: Negative.    Skin: Negative.    Neurological: Negative.    Psychiatric/Behavioral: Negative.        Past Medical History:   Diagnosis Date   • Abdominal pain     Impression: with history of gallstones. Previous Ultrasound performed [facility] 2014. Refer to Megan for further evaluation and treatment.   • Abdominal pain     Impression: with history of gallstones. Previous Ultrasound performed [facility] 2014. Refer to Megan for further evaluation and treatmenT   • Anxiety and depression    • Anxiety and depression     Impression: She was started on Geodon and Burspirone to treat her symptoms. She is currently stable.   • Asthma    • Benign skin lesion    • Birth control     Impression: Nexplanon removal was attempted but failed. Unable to locate the Nexplanon.   • BV (bacterial vaginosis)    • Cellulitis     Impression: She was treated with Bactrim DS to treat her cellulitis.   • Chicken pox    • Ear pain, right     Impression: She was prescribed COrtisporin drops to help with her symptoms.   • Flu-like symptoms     Impression: She was started on Tamiflu to treat her symptoms. Increase fluids. Tylenol/motrin for pain or fever. Medication and medication adverse effects discussed.  Follow-up 5-7 days for reevaluation if not improved or sooner if needed.   • Gallstones    • GERD (gastroesophageal reflux disease)    • Hepatitis-C     Impression: She was referred to GI for further treatment.   • Heterozygous MTHFR mutation C677T (CMS/[facility])    • History of inadequate prenatal care    • History of tobacco use    • Hypoglycemia    • IBS (irritable bowel syndrome)    • Lice    • Low back pain    • Mental disorder in pregnancy    • Morbid obesity with BMI of 40.0-44.9, adult (CMS/[facility])    • Otitis media, acute     Impression: She was given a Rocephin 1gm IM inj to treat her symptoms.   • Personality disorder (CMS/[facility])    • RUQ abdominal pain    • Sore throat     Impression: She was prescribed Clindamycin to treat her symptoms. Increase fluids. Tylenol/motrin for pain or fever. Medication and medication adverse effects discussed. Follow-up 5-7 days for reevaluation if not improved or sooner if needed.   • Tobacco use disorder    • UTI (urinary tract infection)    • Vaginal itching    • Weight loss     Impression: She discussed how she had a desire to loose wt. She was advised to monitor her caloric intake and carb intake. She was encouraged to RTC in 1 month.   G4, P3    Allergies   Allergen Reactions   • Penicillin G Unknown (See Comments)       Past Surgical History:   Procedure Laterality Date   • CHOLECYSTECTOMY     • TONSILLECTOMY AND ADENOIDECTOMY      Septmeber 16, 2010 at [facility]   • TONSILLECTOMY AND ADENOIDECTOMY  09/16/2010       Family History   Problem Relation Age of Onset   • Coronary artery disease Mother    • Diabetes Mother         Diabetes Mellitus   • Liver cancer Father    • Diabetes Sister         Diabetes Mellitus   • Eczema Son    • COPD Maternal Aunt    • Coronary artery disease Maternal Aunt    • Diabetes Maternal Aunt         Diabetes Mellitus   • Hyperlipidemia Maternal Aunt         Hyperlipidemia   • Coronary artery disease Maternal Grandmother    • Stroke Maternal  Grandmother    • Coronary artery disease Maternal Grandfather    • Diabetes Maternal Grandfather         Diabetes Mellitus   • Hyperlipidemia Maternal Grandfather         Hyperlipidemia   • Skin cancer Maternal Grandfather    • Liver cancer Paternal Grandmother    • Liver cancer Paternal Grandfather    • Brain cancer Other         great grandfather   • Hypertension Mother    • Cancer Father         liver   • COPD Maternal Aunt    • Hypertension Maternal Aunt    • Coronary artery disease Maternal Aunt    • Diabetes Maternal Aunt    • Hyperlipidemia Maternal Aunt    • Hypertension Maternal Grandmother    • Hypertension Maternal Grandfather    • Cancer Maternal Grandfather         skin   • Cancer Paternal Grandmother         liver   • Cancer Paternal Grandfather         liver   • Hypertension Other         Maternal Great Aunt        Social History     Socioeconomic History   • Marital status:      Spouse name: Not on file   • Number of children: Not on file   • Years of education: Not on file   • Highest education level: Not on file   Tobacco Use   • Smoking status: Former Smoker     Quit date: 06/2018     Years since quitting: 3.0   • Smokeless tobacco: Never Used   Substance and Sexual Activity   • Alcohol use: Yes     Comment: Occasional   • Drug use: No     Types: Marijuana     Comment: occasionally less than 1 time a month   • Sexual activity: Defer     Prior to Admission medications    Medication Sig Start Date End Date Taking? Authorizing Provider   albuterol sulfate  (90 Base) MCG/ACT inhaler Inhale 2 puffs Every 4 (Four) Hours As Needed for Wheezing or Shortness of Air. 4/10/20   Magdalene Jones MD   ibuprofen (ADVIL,MOTRIN) 800 MG tablet Take 1 tablet by mouth Every 6 (Six) Hours As Needed. 4/8/20   ProviderAnn MD   norethindrone-ethinyl estradiol (Loestrin 1/20, 21,) 1-20 MG-MCG per tablet Take 1 tablet by mouth Daily. 5/15/20   Chuck Burnett Sr., MD   Ondansetron HCl  "(ZOFRAN PO) Take 1 tablet by mouth As Needed.    Provider, MD Ann   Sofosbuvir-Velpatasvir (EPCLUSA PO) Take 1 tablet by mouth Daily.    Provider, MD Ann     /93   Pulse 113   Temp 97.6 °F (36.4 °C) (Oral)   Resp 16   Ht 167.6 cm (66\")   Wt 118 kg (259 lb 9.6 oz)   LMP 02/24/2021   SpO2 100%   Breastfeeding No   BMI 41.90 kg/m²   I examined the patient using the appropriate personal protective equipment.          Objective   Physical Exam  General: Obese female no acute distress, awake and alert  Eyes:  sclera nonicteric  HEENT: Mucous membranes moist, no mucosal swelling  Neck: Supple, no nuchal rigidity, no lymphadenopathy  Respirations: Respirations nonlabored, equal breath sounds bilaterally, clear lungs  Heart regular rate and rhythm, no murmurs rubs or gallops,   Abdomen soft nontender nondistended, no hepatosplenomegaly, no hernia, no mass, normal bowel sounds, no CVA tenderness  Rectal exam performed with nurse in the room there is no fecal impaction or obstruction palpable, no bleeding  Extremities no clubbing cyanosis or edema, calves are symmetric and nontender  Neuro cranial nerves grossly intact, no focal limb deficits  Psych oriented, pleasant affect  Skin no rash, brisk cap refill  Procedures           ED Course      Results for orders placed or performed during the hospital encounter of 06/13/21   Comprehensive Metabolic Panel    Specimen: Blood   Result Value Ref Range    Glucose 99 65 - 99 mg/dL    BUN 5 (L) 6 - 20 mg/dL    Creatinine 0.55 (L) 0.57 - 1.00 mg/dL    Sodium 137 136 - 145 mmol/L    Potassium 3.9 3.5 - 5.2 mmol/L    Chloride 104 98 - 107 mmol/L    CO2 19.0 (L) 22.0 - 29.0 mmol/L    Calcium 9.6 8.6 - 10.5 mg/dL    Total Protein 7.1 6.0 - 8.5 g/dL    Albumin 4.00 3.50 - 5.20 g/dL    ALT (SGPT) 42 (H) 1 - 33 U/L    AST (SGOT) 38 (H) 1 - 32 U/L    Alkaline Phosphatase 97 39 - 117 U/L    Total Bilirubin 0.4 0.0 - 1.2 mg/dL    eGFR Non  Amer 133 >60 " mL/min/1.73    Globulin 3.1 gm/dL    A/G Ratio 1.3 g/dL    BUN/Creatinine Ratio 9.1 7.0 - 25.0    Anion Gap 14.0 5.0 - 15.0 mmol/L   Urinalysis With Culture If Indicated - Urine, Clean Catch    Specimen: Urine, Clean Catch   Result Value Ref Range    Color, UA Dark Yellow (A) Yellow, Straw    Appearance, UA Cloudy (A) Clear    pH, UA 5.5 5.0 - 8.0    Specific Gravity, UA 1.033 (H) 1.005 - 1.030    Glucose, UA Negative Negative    Ketones, UA 80 mg/dL (3+) (A) Negative    Bilirubin, UA Small (1+) (A) Negative    Blood, UA Negative Negative    Protein, UA Trace (A) Negative    Leuk Esterase, UA Small (1+) (A) Negative    Nitrite, UA Negative Negative    Urobilinogen, UA 1.0 E.U./dL 0.2 - 1.0 E.U./dL   CBC Auto Differential    Specimen: Blood   Result Value Ref Range    WBC 8.90 3.40 - 10.80 10*3/mm3    RBC 4.27 3.77 - 5.28 10*6/mm3    Hemoglobin 12.7 12.0 - 15.9 g/dL    Hematocrit 36.9 34.0 - 46.6 %    MCV 86.4 79.0 - 97.0 fL    MCH 29.7 26.6 - 33.0 pg    MCHC 34.4 31.5 - 35.7 g/dL    RDW 13.3 12.3 - 15.4 %    RDW-SD 40.7 37.0 - 54.0 fl    MPV 8.4 6.0 - 12.0 fL    Platelets 274 140 - 450 10*3/mm3    Neutrophil % 72.7 42.7 - 76.0 %    Lymphocyte % 21.0 19.6 - 45.3 %    Monocyte % 4.4 (L) 5.0 - 12.0 %    Eosinophil % 1.3 0.3 - 6.2 %    Basophil % 0.6 0.0 - 1.5 %    Neutrophils, Absolute 6.50 1.70 - 7.00 10*3/mm3    Lymphocytes, Absolute 1.90 0.70 - 3.10 10*3/mm3    Monocytes, Absolute 0.40 0.10 - 0.90 10*3/mm3    Eosinophils, Absolute 0.10 0.00 - 0.40 10*3/mm3    Basophils, Absolute 0.10 0.00 - 0.20 10*3/mm3    nRBC 0.3 (H) 0.0 - 0.2 /100 WBC   Urinalysis, Microscopic Only - Urine, Clean Catch    Specimen: Urine, Clean Catch   Result Value Ref Range    RBC, UA 0-2 (A) None Seen /HPF    WBC, UA 6-12 (A) None Seen /HPF    Bacteria, UA Trace (A) None Seen /HPF    Squamous Epithelial Cells, UA 7-12 (A) None Seen, 0-2 /HPF    Transitional Epithelial Cells, UA 0-2 0 - 2 /HPF    Hyaline Casts, UA 0-2 None Seen /LPF    Calcium  Oxalate Crystals, UA Small/1+ None Seen /HPF    Amorphous Crystals, UA Small/1+ None Seen /HPF    Mucus, UA Moderate/2+ (A) None Seen, Trace /HPF    Starch, UA Small/1+ None Seen /HPF    Methodology Manual Light Microscopy    Gold Top - SST   Result Value Ref Range    Extra Tube Hold for add-ons.                                           MDM  Patient has a benign abdominal examination with normal bowel sounds and no signs of peritonitis or acute abdomen. She is 15 weeks pregnant she is having no vaginal bleeding or discharge. Rectal exam was normal. Urinalysis does suggest some dehydration she was given IV fluids. She had no vomiting during emergency room course. She is discharged good condition to follow-up with her obstetrician and was given warning signs for return.  Final diagnoses:   Constipation, unspecified constipation type   15 weeks gestation of pregnancy   Dehydration       ED Disposition  ED Disposition     ED Disposition Condition Comment    Discharge Stable           Jackie Chisholm MD  [facility] [id number] [phone]    Schedule an appointment as soon as possible for a visit in 1 day           Medication List      No changes were made to your prescriptions during this visit.          Delroy Anton MD  06/13/21 [id number]

## 2021-06-14 LAB — BACTERIA SPEC AEROBE CULT: NORMAL

## 2021-06-14 NOTE — DISCHARGE INSTRUCTIONS
Rest, drink plenty of fluids, follow-up with your gynecologist tomorrow. Return for increased pain, persistent vomiting or any other concerns

## 2022-01-18 DIAGNOSIS — E66.01 OBESITY, CLASS III, BMI 40-49.9 (MORBID OBESITY): Primary | ICD-10-CM

## 2022-01-19 ENCOUNTER — TELEPHONE (OUTPATIENT)
Dept: CARDIOLOGY | Facility: CLINIC | Age: 27
End: 2022-01-19

## 2022-01-26 ENCOUNTER — TELEPHONE (OUTPATIENT)
Dept: BARIATRICS/WEIGHT MGMT | Facility: CLINIC | Age: 27
End: 2022-01-26

## 2022-01-26 NOTE — TELEPHONE ENCOUNTER
Tried calling Chica but there was no answer and mailbox is full. Wanted to remind patient that she needs to get her fasting blood work, chest x-ray and EKG done this week, prior to her intake scheduled for next Tuesday 2.1.2022 at 7:15 am

## 2022-05-10 ENCOUNTER — CONSULT (OUTPATIENT)
Dept: BARIATRICS/WEIGHT MGMT | Facility: CLINIC | Age: 27
End: 2022-05-10

## 2022-05-10 ENCOUNTER — OFFICE VISIT (OUTPATIENT)
Dept: PSYCHIATRY | Facility: CLINIC | Age: 27
End: 2022-05-10

## 2022-05-10 VITALS
WEIGHT: 293 LBS | HEIGHT: 66 IN | BODY MASS INDEX: 47.09 KG/M2 | HEART RATE: 77 BPM | OXYGEN SATURATION: 98 % | SYSTOLIC BLOOD PRESSURE: 147 MMHG | DIASTOLIC BLOOD PRESSURE: 89 MMHG | RESPIRATION RATE: 15 BRPM

## 2022-05-10 DIAGNOSIS — E66.01 MORBID OBESITY: Primary | Chronic | ICD-10-CM

## 2022-05-10 DIAGNOSIS — K21.9 GASTROESOPHAGEAL REFLUX DISEASE, UNSPECIFIED WHETHER ESOPHAGITIS PRESENT: ICD-10-CM

## 2022-05-10 DIAGNOSIS — F43.12 CHRONIC POSTTRAUMATIC STRESS DISORDER: Chronic | ICD-10-CM

## 2022-05-10 DIAGNOSIS — Z71.3 NUTRITIONAL COUNSELING: ICD-10-CM

## 2022-05-10 DIAGNOSIS — E66.01 OBESITY, CLASS III, BMI 40-49.9 (MORBID OBESITY): Primary | ICD-10-CM

## 2022-05-10 DIAGNOSIS — Z01.818 PRE-OPERATIVE EXAMINATION: ICD-10-CM

## 2022-05-10 DIAGNOSIS — Z01.818 PRE-OP EXAM: ICD-10-CM

## 2022-05-10 DIAGNOSIS — F31.81 BIPOLAR II DISORDER: ICD-10-CM

## 2022-05-10 DIAGNOSIS — F60.3 BORDERLINE PERSONALITY DISORDER: ICD-10-CM

## 2022-05-10 PROCEDURE — 99205 OFFICE O/P NEW HI 60 MIN: CPT | Performed by: NURSE PRACTITIONER

## 2022-05-10 PROCEDURE — 90791 PSYCH DIAGNOSTIC EVALUATION: CPT | Performed by: PSYCHIATRY & NEUROLOGY

## 2022-05-10 RX ORDER — PRENATAL VIT NO.126/IRON/FOLIC 28MG-0.8MG
TABLET ORAL DAILY
COMMUNITY

## 2022-05-10 RX ORDER — OMEPRAZOLE 40 MG/1
40 CAPSULE, DELAYED RELEASE ORAL DAILY
Qty: 30 CAPSULE | Refills: 6 | Status: SHIPPED | OUTPATIENT
Start: 2022-05-10

## 2022-05-10 RX ORDER — OMEPRAZOLE 40 MG/1
40 CAPSULE, DELAYED RELEASE ORAL DAILY
COMMUNITY
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
## 2022-05-11 ENCOUNTER — PREP FOR SURGERY (OUTPATIENT)
Dept: OTHER | Facility: HOSPITAL | Age: 27
End: 2022-05-11

## 2022-05-11 RX ORDER — SODIUM CHLORIDE 9 MG/ML
30 INJECTION, SOLUTION INTRAVENOUS CONTINUOUS PRN
Status: CANCELLED | OUTPATIENT
Start: 2022-05-11

## 2022-05-11 RX ORDER — SODIUM CHLORIDE 0.9 % (FLUSH) 0.9 %
10 SYRINGE (ML) INJECTION AS NEEDED
Status: CANCELLED | OUTPATIENT
Start: 2022-05-11

## 2022-05-19 ENCOUNTER — TELEPHONE (OUTPATIENT)
Dept: BARIATRICS/WEIGHT MGMT | Facility: CLINIC | Age: 27
End: 2022-05-19

## 2022-05-19 NOTE — TELEPHONE ENCOUNTER
Called pt to discuss her psychiatric hx of borderline personality disorder and bariatric surgery. Pt was seen on 5/11 for her intake apt. Dr. Wayne recommended further psychiatric workup due to pt's significant phychiatric hx including boarder line personality disorder. Pt's insurance will not cover [facility] psychology. [facility] also typically has concerns for pt's with boarder line personality disorder having bariatric surgery due to potential for making this condition worse. I spoke with the pt and instructed her unfortunately at this time, we would not be able to help her with bariatric surgery due to there hx. Pt verbalized understanding.

## 2022-08-04 ENCOUNTER — TELEPHONE (OUTPATIENT)
Dept: BARIATRICS/WEIGHT MGMT | Facility: CLINIC | Age: 27
End: 2022-08-04

## 2024-03-04 ENCOUNTER — PATIENT ROUNDING (BHMG ONLY) (OUTPATIENT)
Dept: CARDIOLOGY | Facility: CLINIC | Age: 29
End: 2024-03-04

## 2024-03-04 ENCOUNTER — OFFICE VISIT (OUTPATIENT)
Dept: CARDIOLOGY | Facility: CLINIC | Age: 29
End: 2024-03-04
Payer: MEDICAID

## 2024-03-04 VITALS
SYSTOLIC BLOOD PRESSURE: 118 MMHG | HEIGHT: 65 IN | BODY MASS INDEX: 41.65 KG/M2 | OXYGEN SATURATION: 100 % | HEART RATE: 76 BPM | DIASTOLIC BLOOD PRESSURE: 75 MMHG | WEIGHT: 250 LBS

## 2024-03-04 DIAGNOSIS — R03.0 ELEVATED BLOOD PRESSURE READING: Primary | ICD-10-CM

## 2024-03-04 DIAGNOSIS — Z34.90 PREGNANCY, UNSPECIFIED GESTATIONAL AGE: ICD-10-CM

## 2024-03-04 PROCEDURE — 1159F MED LIST DOCD IN RCRD: CPT | Performed by: INTERNAL MEDICINE

## 2024-03-04 PROCEDURE — 93000 ELECTROCARDIOGRAM COMPLETE: CPT | Performed by: INTERNAL MEDICINE

## 2024-03-04 PROCEDURE — 1160F RVW MEDS BY RX/DR IN RCRD: CPT | Performed by: INTERNAL MEDICINE

## 2024-03-04 PROCEDURE — 99204 OFFICE O/P NEW MOD 45 MIN: CPT | Performed by: INTERNAL MEDICINE

## 2024-03-04 RX ORDER — ASPIRIN 81 MG/1
81 TABLET ORAL DAILY
COMMUNITY

## 2024-03-04 RX ORDER — ESCITALOPRAM OXALATE 10 MG/1
1 TABLET ORAL DAILY
COMMUNITY
Start: 2023-12-26

## 2024-03-04 NOTE — PROGRESS NOTES
Encounter Date:03/04/2024      Patient ID: Chica Burnett is a 29 y.o. female.    Chief Complaint:  Pregnant  History of elevated blood pressure during pregnancy.      History of Present Illness  The patient is a pleasant 29-year-old white female is here for evaluation of elevated blood pressure readings.  Patient is pregnant 21 weeks with expected date of delivery 7/16/2024.  This is her fifth pregnancy.  During the last couple of pregnancies patient had an elevated blood pressure.  Patient in the past has taken clonidine.    Patient is asymptomatic.  Patient has lost significant weight.    The patient has been without any chest discomfort ,shortness of breath, palpitations, dizziness or syncope.  Denies having any headache ,abdominal pain ,nausea, vomiting , diarrhea constipation, loss of weight or loss of appetite.  Denies having any excessive bruising ,hematuria or blood in the stool.    Review of all systems negative except as indicated.    Reviewed ROS.    Assessment and Plan     ]]]]]]]]]]]]]]]]]]]]  Impression  =========  -Pregnancy  21 weeks  Pregnancy  Expected date of delivery 7/16/2024    -History of hypertension during pregnancy.    - Exogenous obesity (BMI 42)    - Status post cholecystectomy tonsillectomy    - Family history of hypertension    - Former smoker    - Allergic to penicillin.  ============  Plan  ========  Pregnancy  21 weeks  Pregnancy  Expected date of delivery 7/16/2024    History of elevated blood pressure during pregnancy.    EKG showed normal sinus rhythm normal ECG-3/4/2024.    Maintain blood pressure log.    If blood pressure is increased patient will be started on labetalol starting with low doses.    Follow-up in the office in 3 months.    Further plan will depend on patient's progress  ]]]]]]]]]]]]]]]]]]]]]]]]         Diagnosis Plan   1. Elevated blood pressure reading  ECG 12 Lead      2. Pregnancy, unspecified gestational age  ECG 12 Lead      LAB RESULTS (LAST 7  DAYS)    CBC        BMP        CMP         BNP        TROPONIN        CoAg        Creatinine Clearance  CrCl cannot be calculated (Patient's most recent lab result is older than the maximum 30 days allowed.).    ABG        Radiology  No radiology results for the last day                The following portions of the patient's history were reviewed and updated as appropriate: allergies, current medications, past family history, past medical history, past social history, past surgical history, and problem list.    Review of Systems   Constitutional: Positive for malaise/fatigue.   Cardiovascular:  Positive for chest pain and palpitations. Negative for leg swelling and syncope.   Respiratory:  Positive for shortness of breath.    Skin:  Negative for rash.   Gastrointestinal:  Negative for nausea and vomiting.   Neurological:  Positive for dizziness and light-headedness. Negative for numbness.   All other systems reviewed and are negative.        Current Outpatient Medications:     albuterol sulfate  (90 Base) MCG/ACT inhaler, Inhale 2 puffs Every 4 (Four) Hours As Needed for Wheezing or Shortness of Air., Disp: 1 inhaler, Rfl: 2    aspirin 81 MG EC tablet, Take 1 tablet by mouth Daily., Disp: , Rfl:     docusate sodium 100 MG capsule, Take 1 capsule by mouth., Disp: , Rfl:     escitalopram (LEXAPRO) 10 MG tablet, Take 1 tablet by mouth Daily., Disp: , Rfl:     ferrous sulfate 325 (65 FE) MG tablet, Take 1 tablet by mouth., Disp: , Rfl:     gabapentin (NEURONTIN) 100 MG capsule, Take 2 capsules by mouth., Disp: , Rfl:     hydrocortisone 1 % cream, Apply  topically to the appropriate area as directed., Disp: , Rfl:     hydrOXYzine (ATARAX) 50 MG tablet, Take 1 tablet by mouth., Disp: , Rfl:     ibuprofen (ADVIL,MOTRIN) 800 MG tablet, Take 1 tablet by mouth Every 6 (Six) Hours As Needed., Disp: , Rfl:     Mirena, 52 MG, 20 MCG/24HR IUD, , Disp: , Rfl:     norethindrone-ethinyl estradiol (Loestrin 1/20, 21,) 1-20  MG-MCG per tablet, Take 1 tablet by mouth Daily., Disp: 28 tablet, Rfl: 12    omeprazole (priLOSEC) 40 MG capsule, Take 1 capsule by mouth Daily., Disp: , Rfl:     omeprazole (priLOSEC) 40 MG capsule, Take 1 capsule by mouth Daily., Disp: 30 capsule, Rfl: 6    ondansetron (ZOFRAN) 4 MG tablet, Take 1 tablet by mouth., Disp: , Rfl:     prenatal vitamin (prenatal, CLASSIC, vitamin) tablet, Take  by mouth Daily., Disp: , Rfl:     sennosides-docusate (PERICOLACE) 8.6-50 MG per tablet, Take 1 tablet by mouth Daily., Disp: , Rfl:     valACYclovir (VALTREX) 500 MG tablet, , Disp: , Rfl:     Allergies   Allergen Reactions    Penicillin G Unknown (See Comments)       Family History   Problem Relation Age of Onset    Suicide Attempts Mother     Bipolar disorder Mother     Coronary artery disease Mother     Diabetes Mother         Diabetes Mellitus    Hypertension Mother     Liver cancer Father     Cancer Father         liver    Diabetes Sister         Diabetes Mellitus    COPD Maternal Aunt     Coronary artery disease Maternal Aunt     Diabetes Maternal Aunt         Diabetes Mellitus    Hyperlipidemia Maternal Aunt         Hyperlipidemia    COPD Maternal Aunt     Hypertension Maternal Aunt     Coronary artery disease Maternal Aunt     Diabetes Maternal Aunt     Hyperlipidemia Maternal Aunt     Coronary artery disease Maternal Grandfather     Diabetes Maternal Grandfather         Diabetes Mellitus    Hyperlipidemia Maternal Grandfather         Hyperlipidemia    Skin cancer Maternal Grandfather     Hypertension Maternal Grandfather     Cancer Maternal Grandfather         skin    Self-Injurious Behavior  Maternal Grandmother     Anxiety disorder Maternal Grandmother     Bipolar disorder Maternal Grandmother     Coronary artery disease Maternal Grandmother     Stroke Maternal Grandmother     Hypertension Maternal Grandmother     Liver cancer Paternal Grandfather     Cancer Paternal Grandfather         liver    Liver cancer  Paternal Grandmother     Cancer Paternal Grandmother         liver    Eczema Son     Brain cancer Other         great grandfather    Hypertension Other         Maternal Great Aunt        Past Surgical History:   Procedure Laterality Date    CHOLECYSTECTOMY  2018    TONSILLECTOMY AND ADENOIDECTOMY      [facility] 16, 2010 at [facility]    TONSILLECTOMY AND ADENOIDECTOMY  09/16/2010       Past Medical History:   Diagnosis Date    Abdominal pain     Impression: with history of gallstones. Previous Ultrasound performed [facility] 2014. Refer to Megan for further evaluation and treatment.    Abdominal pain     Impression: with history of gallstones. Previous Ultrasound performed [facility] 2014. Refer to Megan for further evaluation and treatmenT    Anxiety and depression     Anxiety and depression     Impression: She was started on Geodon and Burspirone to treat her symptoms. She is currently stable.    Asthma     Benign skin lesion     Birth control     Impression: Nexplanon removal was attempted but failed. Unable to locate the Nexplanon.    BV (bacterial vaginosis)     Cellulitis     Impression: She was treated with Bactrim DS to treat her cellulitis.    Chicken pox     Ear pain, right     Impression: She was prescribed COrtisporin drops to help with her symptoms.    Flu-like symptoms     Impression: She was started on Tamiflu to treat her symptoms. Increase fluids. Tylenol/motrin for pain or fever. Medication and medication adverse effects discussed. Follow-up 5-7 days for reevaluation if not improved or sooner if needed.    Gallstones     GERD (gastroesophageal reflux disease)     Hepatitis-C     Impression: She was referred to GI for further treatment.    Heterozygous MTHFR mutation [id number]     History of inadequate prenatal care     History of tobacco use     Hypoglycemia     IBS (irritable bowel syndrome)     Lice     Low back pain     Mental disorder in pregnancy     Morbid obesity with BMI of 40.0-44.9, adult     Otitis  media, acute     Impression: She was given a Rocephin 1gm IM inj to treat her symptoms.    Personality disorder     RUQ abdominal pain     Sore throat     Impression: She was prescribed Clindamycin to treat her symptoms. Increase fluids. Tylenol/motrin for pain or fever. Medication and medication adverse effects discussed. Follow-up 5-7 days for reevaluation if not improved or sooner if needed.    Tobacco use disorder     UTI (urinary tract infection)     Vaginal itching     Weight loss     Impression: She discussed how she had a desire to loose wt. She was advised to monitor her caloric intake and carb intake. She was encouraged to RTC in 1 month.       Family History   Problem Relation Age of Onset    Suicide Attempts Mother     Bipolar disorder Mother     Coronary artery disease Mother     Diabetes Mother         Diabetes Mellitus    Hypertension Mother     Liver cancer Father     Cancer Father         liver    Diabetes Sister         Diabetes Mellitus    COPD Maternal Aunt     Coronary artery disease Maternal Aunt     Diabetes Maternal Aunt         Diabetes Mellitus    Hyperlipidemia Maternal Aunt         Hyperlipidemia    COPD Maternal Aunt     Hypertension Maternal Aunt     Coronary artery disease Maternal Aunt     Diabetes Maternal Aunt     Hyperlipidemia Maternal Aunt     Coronary artery disease Maternal Grandfather     Diabetes Maternal Grandfather         Diabetes Mellitus    Hyperlipidemia Maternal Grandfather         Hyperlipidemia    Skin cancer Maternal Grandfather     Hypertension Maternal Grandfather     Cancer Maternal Grandfather         skin    Self-Injurious Behavior  Maternal Grandmother     Anxiety disorder Maternal Grandmother     Bipolar disorder Maternal Grandmother     Coronary artery disease Maternal Grandmother     Stroke Maternal Grandmother     Hypertension Maternal Grandmother     Liver cancer Paternal Grandfather     Cancer Paternal Grandfather         liver    Liver cancer Paternal  "Grandmother     Cancer Paternal Grandmother         liver    Eczema Son     Brain cancer Other         great grandfather    Hypertension Other         Maternal Great Aunt        Social History     Socioeconomic History    Marital status:    Tobacco Use    Smoking status: Former     Current packs/day: 0.00     Types: Cigarettes     Quit date: 2018     Years since quittin.7     Passive exposure: Current    Smokeless tobacco: Never   Vaping Use    Vaping status: Some Days    Substances: Delta 8 gummies & CBD drops 9 (low levels thc)   Substance and Sexual Activity    Alcohol use: Yes     Comment: Occasional    Drug use: No     Types: Marijuana     Comment: occasionally less than 1 time a month    Sexual activity: Defer           ECG 12 Lead    Date/Time: 3/4/2024 12:45 PM  Performed by: Domenico Maxwell MD    Authorized by: Domenico Maxwell MD  Comparison: compared with previous ECG   Comparison to previous ECG: Sinus rhythm 76/min normal axis normal intervals no ectopy no significant change from previous EKG.          Objective:       Physical Exam    /75 (BP Location: Right arm, Patient Position: Sitting, Cuff Size: Large Adult)   Pulse 76   Ht 165.1 cm (65\")   Wt 113 kg (250 lb)   SpO2 100% Comment: ra  BMI 41.60 kg/m²   The patient is alert, oriented and in no distress.    Vital signs as noted above.    Head and neck revealed no carotid bruits or jugular venous distension.  No thyromegaly or lymphadenopathy is present.    Lungs clear.  No wheezing.  Breath sounds are normal bilaterally.    Heart normal first and second heart sounds.  No murmur..  No pericardial rub is present.  No gallop is present.    Abdomen soft and nontender.  No organomegaly is present.  Pregnancy.    Extremities revealed good peripheral pulses without any pedal edema.    Skin warm and dry.    Musculoskeletal system is grossly normal.    CNS grossly normal.    Reviewed and updated.        "

## 2024-03-04 NOTE — PROGRESS NOTES
A My-Chart message has been sent to the patient for PATIENT ROUNDING with [facility] [place]